# Patient Record
Sex: FEMALE | HISPANIC OR LATINO | Employment: FULL TIME | URBAN - METROPOLITAN AREA
[De-identification: names, ages, dates, MRNs, and addresses within clinical notes are randomized per-mention and may not be internally consistent; named-entity substitution may affect disease eponyms.]

---

## 2017-01-23 ENCOUNTER — HOSPITAL ENCOUNTER (OUTPATIENT)
Dept: RADIOLOGY | Facility: MEDICAL CENTER | Age: 42
End: 2017-01-23
Attending: NURSE PRACTITIONER
Payer: COMMERCIAL

## 2017-01-23 DIAGNOSIS — Z13.9 SCREENING: ICD-10-CM

## 2017-02-09 ENCOUNTER — HOSPITAL ENCOUNTER (OUTPATIENT)
Dept: RADIOLOGY | Facility: MEDICAL CENTER | Age: 42
End: 2017-02-09
Attending: OBSTETRICS & GYNECOLOGY
Payer: COMMERCIAL

## 2017-02-09 DIAGNOSIS — N60.01 BREAST CYST, RIGHT: ICD-10-CM

## 2017-02-09 DIAGNOSIS — Z09 FOLLOW-UP EXAM, 3-6 MONTHS SINCE PREVIOUS EXAM: ICD-10-CM

## 2017-02-09 PROCEDURE — 76642 ULTRASOUND BREAST LIMITED: CPT | Mod: RT

## 2017-06-29 ENCOUNTER — OFFICE VISIT (OUTPATIENT)
Dept: URGENT CARE | Facility: CLINIC | Age: 42
End: 2017-06-29
Payer: COMMERCIAL

## 2017-06-29 ENCOUNTER — APPOINTMENT (OUTPATIENT)
Dept: RADIOLOGY | Facility: IMAGING CENTER | Age: 42
End: 2017-06-29
Attending: PHYSICIAN ASSISTANT
Payer: COMMERCIAL

## 2017-06-29 VITALS
OXYGEN SATURATION: 97 % | BODY MASS INDEX: 27.32 KG/M2 | WEIGHT: 170 LBS | HEIGHT: 66 IN | HEART RATE: 102 BPM | TEMPERATURE: 98.7 F | SYSTOLIC BLOOD PRESSURE: 118 MMHG | DIASTOLIC BLOOD PRESSURE: 80 MMHG
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
## 2017-06-29 DIAGNOSIS — M25.562 ACUTE PAIN OF LEFT KNEE: ICD-10-CM

## 2017-06-29 PROCEDURE — 99214 OFFICE O/P EST MOD 30 MIN: CPT | Performed by: PHYSICIAN ASSISTANT

## 2017-06-29 PROCEDURE — 73562 X-RAY EXAM OF KNEE 3: CPT | Mod: TC | Performed by: PHYSICIAN ASSISTANT

## 2017-06-29 RX ORDER — ACETAMINOPHEN AND CODEINE PHOSPHATE 300; 30 MG/1; MG/1
1 TABLET ORAL EVERY 8 HOURS
Qty: 15 TAB | Refills: 0 | Status: SHIPPED | OUTPATIENT
Start: 2017-06-29 | End: 2017-07-04

## 2017-06-29 ASSESSMENT — ENCOUNTER SYMPTOMS
COUGH: 0
HEADACHES: 0
BACK PAIN: 0
DIZZINESS: 0
EYE DISCHARGE: 0
WHEEZING: 0
JOINT SWELLING: 1
VOMITING: 0
SENSORY CHANGE: 0
NECK PAIN: 0
NAUSEA: 0
VISUAL CHANGE: 0
FALLS: 0
EYE REDNESS: 0
CHILLS: 0
ABDOMINAL PAIN: 0

## 2017-06-29 ASSESSMENT — PAIN SCALES - GENERAL: PAINLEVEL: 7=MODERATE-SEVERE PAIN

## 2017-06-29 NOTE — PROGRESS NOTES
"Subjective:      Aniyah Shelton is a 41 y.o. female who presents with Knee Pain          Pt is 40 y/o female who presents with left knee pain for the last 6 days. Pt's first injury was when she was playing volleyball and fell straight on top her knee. She was \"Sore\" but has continued to walk on it. Then yesterday she was walking down a hill and her foot slipped causing her knee to twist and then it gave out on her. She now reports pain to the inside portion of her knee with swelling.   Knee Pain  This is a new problem. Episode onset: 6 days ago. The problem occurs intermittently (Worse since yesterday. ). The problem has been gradually worsening. Associated symptoms include joint swelling. Pertinent negatives include no abdominal pain, chills, congestion, coughing, headaches, nausea, neck pain, rash, visual change or vomiting. Exacerbated by: twisting and fully extending knee, walking.  She has tried acetaminophen and ice for the symptoms. The treatment provided mild relief.       Review of Systems   Constitutional: Negative for chills.   HENT: Negative for congestion.    Eyes: Negative for discharge and redness.   Respiratory: Negative for cough and wheezing.    Gastrointestinal: Negative for nausea, vomiting and abdominal pain.   Musculoskeletal: Positive for joint pain and joint swelling. Negative for back pain, falls and neck pain.   Skin: Negative for rash.   Neurological: Negative for dizziness, sensory change and headaches.          Objective:     /80 mmHg  Pulse 102  Temp(Src) 37.1 °C (98.7 °F)  Ht 1.676 m (5' 5.98\")  Wt 77.111 kg (170 lb)  BMI 27.45 kg/m2  SpO2 97%   PMH:  has a past medical history of Anxiety; Migraine (5/24/2010); GERD (gastroesophageal reflux disease) (5/24/2010); Sinusitis; Indigestion; Heart burn; Other specified disorder of intestines; and Psychiatric problem. She also has no past medical history of Allergy or Anemia.  MEDS:   Current outpatient prescriptions:   •  " ranitidine (ZANTAC) 150 MG Tab, TAKE ONE TABLET BY MOUTH TWICE DAILY, Disp: 60 Tab, Rfl: 6  •  sumatriptan (IMITREX) 100 MG tablet, Take 1 Tab by mouth Once PRN for Migraine for up to 1 dose., Disp: 10 Tab, Rfl: 3  •  ondansetron (ZOFRAN) 4 MG Tab tablet, Take 1 Tab by mouth every 8 hours as needed for Nausea/Vomiting., Disp: 10 Each, Rfl: 2  •  fluticasone (FLONASE) 50 MCG/ACT nasal spray, Spray 2 Sprays in nose every day., Disp: 1 Bottle, Rfl: 0  ALLERGIES:   Allergies   Allergen Reactions   • Aspirin Hives and Swelling   • Ibuprofen Swelling   • Naproxen Anaphylaxis     SURGHX:   Past Surgical History   Procedure Laterality Date   • Antrostomy  4/3/2013     Performed by Camryn Dia M.D. at SURGERY SAME DAY [facility]   • Ethmoidectomy  4/3/2013     Performed by Camryn Dia M.D. at SURGERY SAME DAY [facility]   • Sphenoidectomy  4/3/2013     Performed by Camryn Dia M.D. at SURGERY SAME DAY [facility]   • Turbinoplasty  4/3/2013     Performed by Camryn Dia M.D. at SURGERY SAME DAY [facility]   • Hysteroscopy novasure-2  8/1/2014     Performed by Emmanuel Mac M.D. at SURGERY SAME DAY [facility]   • Us-cyst aspiration-breast initial Left 5-     SOCHX:  reports that she has never smoked. She has never used smokeless tobacco. She reports that she does not drink alcohol or use illicit drugs.  FH: Family history was reviewed, no pertinent findings to report    Physical Exam   Constitutional: She is oriented to person, place, and time. She appears well-developed and well-nourished.   HENT:   Head: Normocephalic and atraumatic.   Eyes: EOM are normal. Pupils are equal, round, and reactive to light.   Neck: Normal range of motion. Neck supple.   Cardiovascular: Normal rate and regular rhythm.    Pulmonary/Chest: Effort normal and breath sounds normal.   Musculoskeletal:   Left knee:  With medial knee swelling- without noted deformity  Tenderness along medial compartment  with increased pain with Guero. Limited ROM with extension.   Neg. , Anterior and posterior drawer signs, and Lachman’s test.      Neurological: She is alert and oriented to person, place, and time.   Skin: Skin is warm.   Abrasion to left knee.    Vitals reviewed.            XR knee:  No acute fracture identified.  No evidence of any joint effusion.  Tiny patellar spurs.    Assessment/Plan:     1. Acute pain of left knee  - DX-KNEE 3 VIEWS LEFT; Future    2. Fall, initial encounter    At this time I am concerned about a possible meniscus injury- pt. Was placed in brace and has crutches at home.   Tylenol #3 was written for night time use.   NARCHECK was reviewed by myself-  Document  does not reveal any concerning patterns. Pt. was advised to avoid the operation of heavy machine along with driving while on such medications. Finally pt. was advised to use medication only as prescribed.     Ice, and compression, elevate the knee.   Referral to Dr. Brown for further monitoring and re-evaluation was made today. Discussed xray with patient today as well.   All questions were answered.   Patient given precautionary s/sx that mandate immediate follow up and evaluation in the ED. Advised of risks of not doing so.    DDX, Supportive care, and indications for immediate follow-up discussed with patient.    Instructed to return to clinic or nearest emergency department if we are not available for any change in condition, further concerns, or worsening of symptoms.    The patient demonstrated a good understanding and agreed with the treatment plan.

## 2017-06-29 NOTE — MR AVS SNAPSHOT
"        Aniyah Shelton   2017 8:15 AM   Office Visit   MRN: [id number]    Department:  [facility]   Dept Phone:  [phone]    Description:  Female : 1975   Provider:  Shant Lemos PA-C           Reason for Visit     Knee Pain \"fell down and twisted knee yesterday,a little swollen\"      Allergies as of 2017     Allergen Noted Reactions    Aspirin 2011   Hives, Swelling    Ibuprofen 2009   Swelling    Naproxen 2013   Anaphylaxis      You were diagnosed with     Acute pain of left knee   [5413406]       Fall, initial encounter   [[id number]]         Vital Signs     Blood Pressure Pulse Temperature Height Weight Body Mass Index    118/80 mmHg 102 37.1 °C (98.7 °F) 1.676 m (5' 5.98\") 77.111 kg (170 lb) 27.45 kg/m2    Oxygen Saturation Smoking Status                97% Never Smoker           Basic Information     Date Of Birth Sex Race Ethnicity Preferred Language    1975 Female  or   Origin (Macedonian,Montenegrin,Lithuanian,Truong, etc) English      Problem List              ICD-10-CM Priority Class Noted - Resolved    GERD (gastroesophageal reflux disease) K21.9   2010 - Present    Food allergy Z91.018   11/15/2011 - Present    Chronic maxillary sinusitis J32.0   4/3/2013 - Present    Vitamin D deficiency disease E55.9   10/15/2013 - Present    Metrorrhagia N92.1   2014 - Present    Periodic headache syndrome, not intractable G43.C0   2016 - Present      Health Maintenance        Date Due Completion Dates    PAP SMEAR 1995 ---    MAMMOGRAM 2017, 2012, 2010, 2007, 2007    IMM DTaP/Tdap/Td Vaccine (2 - Td) 2022    COLONOSCOPY 2024            Current Immunizations     Influenza LAIV (Nasal) 10/15/2012    Influenza TIV (IM) 2014, 10/22/2013    Influenza Vaccine Quad Inj (Pf) 2014    Tdap Vaccine 2012  4:43 PM      Below and/or attached are the " medications your provider expects you to take. Review all of your home medications and newly ordered medications with your provider and/or pharmacist. Follow medication instructions as directed by your provider and/or pharmacist. Please keep your medication list with you and share with your provider. Update the information when medications are discontinued, doses are changed, or new medications (including over-the-counter products) are added; and carry medication information at all times in the event of emergency situations     Allergies:  ASPIRIN - Hives,Swelling     IBUPROFEN - Swelling     NAPROXEN - Anaphylaxis               Medications  Valid as of: June 29, 2017 -  9:57 AM    Generic Name Brand Name Tablet Size Instructions for use    Acetaminophen-Codeine (Tab) Acetaminophen-Codeine 300-30 MG Take 1 Tab by mouth every 8 hours for 5 days.        Fluticasone Propionate (Suspension) FLONASE 50 MCG/ACT Spray 2 Sprays in nose every day.        Ondansetron HCl (Tab) ZOFRAN 4 MG Take 1 Tab by mouth every 8 hours as needed for Nausea/Vomiting.        RaNITidine HCl (Tab) ZANTAC 150 MG TAKE ONE TABLET BY MOUTH TWICE DAILY        SUMAtriptan Succinate (Tab) IMITREX 100 MG Take 1 Tab by mouth Once PRN for Migraine for up to 1 dose.        .                 Medicines prescribed today were sent to:     [id number] - [place], [place] - [address] [facility]    [id number] [place] [address]    Phone: [phone] Fax: [phone]    Open 24 Hours?: No      Medication refill instructions:       If your prescription bottle indicates you have medication refills left, it is not necessary to call your provider’s office. Please contact your pharmacy and they will refill your medication.    If your prescription bottle indicates you do not have any refills left, you may request refills at any time through one of the following ways: The online [facility] system (except Urgent Care), by calling your provider’s office, or by  asking your pharmacy to contact your provider’s office with a refill request. Medication refills are processed only during regular business hours and may not be available until the next business day. Your provider may request additional information or to have a follow-up visit with you prior to refilling your medication.   *Please Note: Medication refills are assigned a new Rx number when refilled electronically. Your pharmacy may indicate that no refills were authorized even though a new prescription for the same medication is available at the pharmacy. Please request the medicine by name with the pharmacy before contacting your provider for a refill.        Your To Do List     Future Labs/Procedures Complete By Expires    DX-KNEE 3 VIEWS LEFT  As directed 6/29/2018         [employer] Access Code: Activation code not generated  Current [employer] Status: Active

## 2017-06-29 NOTE — Clinical Note
June 29, 2017         Patient: Aniyah Shelton   YOB: 1975   Date of Visit: 6/29/2017           To Whom it May Concern:    Aniyah Shelton was seen in my clinic on 6/29/2017. Please excuse this patient from work due to recent injury- Patient may return on Monday if symptoms have improved.     If you have any questions or concerns, please don't hesitate to call.        Sincerely,           Shant Lemos PA-C  Electronically Signed

## 2017-07-13 ENCOUNTER — TELEPHONE (OUTPATIENT)
Dept: MEDICAL GROUP | Facility: MEDICAL CENTER | Age: 42
End: 2017-07-13

## 2017-07-13 NOTE — TELEPHONE ENCOUNTER
1. Caller Name: Aniyah Shelton                                             Call Back Number: [phone]        Patient approves a detailed voicemail message: N\A    Pt left a vm stating she was seen in urgent care on 6/29 because she twisted her knee and she was given a brace at that time, had x rays done and was told that they would give her a referral to see an specialist but has not heard anything so she wanted us to place a new referral.  She has not seen us over a year so i called her to let her know that for any orders needed she would need to schedule an appt since we have not seen her over a year. i checked on pt chart and seems like the referral was placed form urgent care, she needs to call the referral department to schedule an appt.

## 2017-07-17 ENCOUNTER — OFFICE VISIT (OUTPATIENT)
Dept: MEDICAL GROUP | Facility: CLINIC | Age: 42
End: 2017-07-17
Payer: COMMERCIAL

## 2017-07-17 VITALS
HEART RATE: 88 BPM | SYSTOLIC BLOOD PRESSURE: 122 MMHG | TEMPERATURE: 98.2 F | WEIGHT: 170 LBS | OXYGEN SATURATION: 96 % | DIASTOLIC BLOOD PRESSURE: 78 MMHG | RESPIRATION RATE: 18 BRPM | HEIGHT: 66 IN | BODY MASS INDEX: 27.32 KG/M2

## 2017-07-17 DIAGNOSIS — M25.562 ACUTE PAIN OF LEFT KNEE: ICD-10-CM

## 2017-07-17 DIAGNOSIS — M25.662 DECREASED RANGE OF MOTION OF LEFT KNEE: ICD-10-CM

## 2017-07-17 DIAGNOSIS — S83.412A SPRAIN OF MEDIAL COLLATERAL LIGAMENT OF LEFT KNEE, INITIAL ENCOUNTER: ICD-10-CM

## 2017-07-17 PROCEDURE — 99202 OFFICE O/P NEW SF 15 MIN: CPT | Performed by: FAMILY MEDICINE

## 2017-07-17 RX ORDER — TRAMADOL HYDROCHLORIDE 50 MG/1
50 TABLET ORAL EVERY 6 HOURS PRN
Qty: 28 TAB | Refills: 0 | Status: SHIPPED | OUTPATIENT
Start: 2017-07-17 | End: 2018-01-04

## 2017-07-17 ASSESSMENT — PATIENT HEALTH QUESTIONNAIRE - PHQ9: CLINICAL INTERPRETATION OF PHQ2 SCORE: 0

## 2017-07-17 NOTE — MR AVS SNAPSHOT
"        Aniyah Shelton   2017 2:30 PM   Office Visit   MRN: [id number]    Department:  [facility]   Dept Phone:  [phone]    Description:  Female : 1975   Provider:  Wilmer Brown M.D.           Reason for Visit     Knee Pain Referral from / L knee Pain       Allergies as of 2017     Allergen Noted Reactions    Aspirin 2011   Hives, Swelling    Ibuprofen 2009   Swelling    Naproxen 2013   Anaphylaxis      You were diagnosed with     Acute pain of left knee   [3137703]       Sprain of medial collateral ligament of left knee, initial encounter   [836163]       Decreased range of motion of left knee   [[id number]]         Vital Signs     Blood Pressure Pulse Temperature Respirations Height Weight    122/78 mmHg 88 36.8 °C (98.2 °F) 18 1.676 m (5' 6\") 77.111 kg (170 lb)    Body Mass Index Oxygen Saturation Smoking Status             27.45 kg/m2 96% Never Smoker          Basic Information     Date Of Birth Sex Race Ethnicity Preferred Language    1975 Female  or   Origin (Belarusian,Indian,Tongan,Truong, etc) English      Your appointments     2017  3:00 PM   Established Patient with Shashi Valdez M.D.   [facility] [address]    [address]  [address]  [facility] [id number] [phone]           You will be receiving a confirmation call a few days before your appointment from our automated call confirmation system.            Aug 18, 2017  4:00 PM   Established Patient with Wilmer Brown M.D.   [facility]    [address] [facility] [id number] [phone]           You will be receiving a confirmation call a few days before your appointment from our automated call confirmation system.              Problem List              ICD-10-CM Priority Class Noted - Resolved    GERD (gastroesophageal reflux disease) K21.9   2010 - Present    Food allergy " Z91.018   11/15/2011 - Present    Chronic maxillary sinusitis J32.0   4/3/2013 - Present    Vitamin D deficiency disease E55.9   10/15/2013 - Present    Metrorrhagia N92.1   8/1/2014 - Present    Periodic headache syndrome, not intractable G43.C0   5/9/2016 - Present      Health Maintenance        Date Due Completion Dates    PAP SMEAR 9/22/1995 ---    MAMMOGRAM 5/18/2017 5/18/2016, 5/18/2012, 12/13/2010, 11/9/2007, 11/9/2007    IMM INFLUENZA (1) 9/1/2017 11/12/2014, 11/12/2014, 10/22/2013, 10/15/2012    IMM DTaP/Tdap/Td Vaccine (2 - Td) 4/26/2022 4/26/2012    COLONOSCOPY 4/28/2024 4/28/2014            Current Immunizations     Influenza LAIV (Nasal) 10/15/2012    Influenza TIV (IM) 11/12/2014, 10/22/2013    Influenza Vaccine Quad Inj (Pf) 11/12/2014    Tdap Vaccine 4/26/2012  4:43 PM      Below and/or attached are the medications your provider expects you to take. Review all of your home medications and newly ordered medications with your provider and/or pharmacist. Follow medication instructions as directed by your provider and/or pharmacist. Please keep your medication list with you and share with your provider. Update the information when medications are discontinued, doses are changed, or new medications (including over-the-counter products) are added; and carry medication information at all times in the event of emergency situations     Allergies:  ASPIRIN - Hives,Swelling     IBUPROFEN - Swelling     NAPROXEN - Anaphylaxis               Medications  Valid as of: July 17, 2017 -  3:22 PM    Generic Name Brand Name Tablet Size Instructions for use    Fluticasone Propionate (Suspension) FLONASE 50 MCG/ACT Spray 2 Sprays in nose every day.        Ondansetron HCl (Tab) ZOFRAN 4 MG Take 1 Tab by mouth every 8 hours as needed for Nausea/Vomiting.        RaNITidine HCl (Tab) ZANTAC 150 MG TAKE ONE TABLET BY MOUTH TWICE DAILY        SUMAtriptan Succinate (Tab) IMITREX 100 MG Take 1 Tab by mouth Once PRN for Migraine  for up to 1 dose.        TraMADol HCl (Tab) ULTRAM 50 MG Take 1 Tab by mouth every 6 hours as needed for Moderate Pain.        .                 Medicines prescribed today were sent to:     [facility] - [place], [place] - [address] [facility]    [address] [place] [address]    Phone: [phone] Fax: [phone]    Open 24 Hours?: No      Medication refill instructions:       If your prescription bottle indicates you have medication refills left, it is not necessary to call your provider’s office. Please contact your pharmacy and they will refill your medication.    If your prescription bottle indicates you do not have any refills left, you may request refills at any time through one of the following ways: The online [employer] system (except Urgent Care), by calling your provider’s office, or by asking your pharmacy to contact your provider’s office with a refill request. Medication refills are processed only during regular business hours and may not be available until the next business day. Your provider may request additional information or to have a follow-up visit with you prior to refilling your medication.   *Please Note: Medication refills are assigned a new Rx number when refilled electronically. Your pharmacy may indicate that no refills were authorized even though a new prescription for the same medication is available at the pharmacy. Please request the medicine by name with the pharmacy before contacting your provider for a refill.        Referral     A referral request has been sent to our patient care coordination department. Please allow 3-5 business days for us to process this request and contact you either by phone or mail. If you do not hear from us by the 5th business day, please call us at [phone].           [employer] Access Code: Activation code not generated  Current [employer] Status: Active

## 2017-07-17 NOTE — TELEPHONE ENCOUNTER
Patient has been advised about the message below and scheduled an appointment on 7/26 to see Dr. Valdez.

## 2017-07-17 NOTE — PROGRESS NOTES
CHIEF COMPLAINT:  Aniyah Shelton female presenting at the request of Shant Lemos PA-C for evaluation of knee pain.     Aniyah Shelton is complaining of left knee pain  present for DOI: 6/28/17 , cleaning her yard and slipped, valgus injury (slipped on patch of grass), heard grinding noise and a pop..  Pain is at the anterior, medial  knee  Quality is aching, sharp  Pain is non-radiating   Improved with resting  Aggravated by movement, stairs  no prior problems with this area in the past   Prior Treatments: seen at    Prior studies: X-Ray   Medications tried for pain include: tylenol with minimal impromvement  Mechanical Symptom history: No Locking    PAST MEDICAL HISTORY:   History reviewed. No pertinent past medical history.    PMH:  has a past medical history of Anxiety; Migraine (5/24/2010); GERD (gastroesophageal reflux disease) (5/24/2010); Sinusitis; Indigestion; Heart burn; Other specified disorder of intestines; and Psychiatric problem. She also has no past medical history of Allergy or Anemia.  MEDS:   Current outpatient prescriptions:   •  ranitidine (ZANTAC) 150 MG Tab, TAKE ONE TABLET BY MOUTH TWICE DAILY, Disp: 60 Tab, Rfl: 6  •  sumatriptan (IMITREX) 100 MG tablet, Take 1 Tab by mouth Once PRN for Migraine for up to 1 dose., Disp: 10 Tab, Rfl: 3  •  ondansetron (ZOFRAN) 4 MG Tab tablet, Take 1 Tab by mouth every 8 hours as needed for Nausea/Vomiting., Disp: 10 Each, Rfl: 2  •  fluticasone (FLONASE) 50 MCG/ACT nasal spray, Spray 2 Sprays in nose every day., Disp: 1 Bottle, Rfl: 0  ALLERGIES:   Allergies   Allergen Reactions   • Aspirin Hives and Swelling   • Ibuprofen Swelling   • Naproxen Anaphylaxis     SURGHX:   Past Surgical History   Procedure Laterality Date   • Antrostomy  4/3/2013     Performed by Camryn Dia M.D. at SURGERY SAME DAY [facility]   • Ethmoidectomy  4/3/2013     Performed by Camryn Dia M.D. at SURGERY SAME DAY [facility]   • Sphenoidectomy   "4/3/2013     Performed by Camryn Dia M.D. at SURGERY SAME DAY [facility]   • Turbinoplasty  4/3/2013     Performed by Camryn Dia M.D. at SURGERY SAME DAY [facility]   • Hysteroscopy novasure-2  8/1/2014     Performed by Emmanuel Mac M.D. at SURGERY SAME DAY [facility]   • Us-cyst aspiration-breast initial Left 5-     SOCHX:  reports that she has never smoked. She has never used smokeless tobacco. She reports that she does not drink alcohol or use illicit drugs.  FH: Family history was reviewed, no pertinent findings to report     PHYSICAL EXAM:  /78 mmHg  Pulse 88  Temp(Src) 36.8 °C (98.2 °F)  Resp 18  Ht 1.676 m (5' 6\")  Wt 77.111 kg (170 lb)  BMI 27.45 kg/m2  SpO2 96%     slightly overweight in no apparent distress, alert and oriented x 3.  Gait: antalgic     RIGHT Knee:  Slight Varus and No Swelling  Range of Motion Intact  Trace effusion  Patellar No tenderness and no apprehension  Medial Joint Line Non-tender and NEGATIVE Guero  Lateral Joint Line Non-tender and NEGATIVE Guero  Trace Laxity with Varus stress  Trace Laxity with Valgus stress  Lachman's testing is Trace  Posterior Drawer Testing is Trace  The leg is otherwise neurovascularly intact    LEFT Knee:  Slight Varus and No Swelling   Range of Motion Markedly limited with Extension (15 degrees extension)   Trace effusion  Patellar No tenderness and no apprehension  Medial Joint Line Tenderness and NEGATIVE Guero  Lateral Joint Line Non-tender and NEGATIVE Guero  Trace Laxity with Varus stress  1+ Laxity with Valgus stress  Lachman's testing is Trace  Posterior Drawer Testing is Trace  The leg is otherwise neurovascularly intact      Additional Findings: None      1. Acute pain of left knee     2. Sprain of medial collateral ligament of left knee, initial encounter  tramadol (ULTRAM) 50 MG Tab   3. Decreased range of motion of left knee  REFERRAL TO PHYSICAL THERAPY Reason for Therapy: " Eval/Treat/Report     Hinged knee brace (patient has at home)  Recommend use  PT  Follow-up if symptoms worsen or fail to improve    Return in about 4 weeks (around 8/14/2017).    done elsewhere and reviewed independently by me    Thank you Shant Lemos PA-C for allowing me to participate in caring for your patient.

## 2017-07-26 ENCOUNTER — HOSPITAL ENCOUNTER (OUTPATIENT)
Dept: PHYSICAL THERAPY | Facility: REHABILITATION | Age: 42
End: 2017-07-26
Attending: FAMILY MEDICINE
Payer: COMMERCIAL

## 2017-07-26 PROCEDURE — 97161 PT EVAL LOW COMPLEX 20 MIN: CPT

## 2017-07-26 PROCEDURE — 97014 ELECTRIC STIMULATION THERAPY: CPT

## 2017-07-31 ENCOUNTER — APPOINTMENT (OUTPATIENT)
Dept: PHYSICAL THERAPY | Facility: REHABILITATION | Age: 42
End: 2017-07-31
Attending: FAMILY MEDICINE
Payer: COMMERCIAL

## 2017-08-03 ENCOUNTER — APPOINTMENT (OUTPATIENT)
Dept: PHYSICAL THERAPY | Facility: REHABILITATION | Age: 42
End: 2017-08-03
Attending: FAMILY MEDICINE
Payer: COMMERCIAL

## 2017-08-07 ENCOUNTER — APPOINTMENT (OUTPATIENT)
Dept: PHYSICAL THERAPY | Facility: REHABILITATION | Age: 42
End: 2017-08-07
Attending: FAMILY MEDICINE
Payer: COMMERCIAL

## 2017-08-17 RX ORDER — RANITIDINE 150 MG/1
TABLET ORAL
Qty: 60 TAB | Refills: 6 | Status: SHIPPED | OUTPATIENT
Start: 2017-08-17 | End: 2018-08-26 | Stop reason: SDUPTHER

## 2018-01-04 ENCOUNTER — OFFICE VISIT (OUTPATIENT)
Dept: MEDICAL GROUP | Facility: MEDICAL CENTER | Age: 43
End: 2018-01-04
Payer: COMMERCIAL

## 2018-01-04 VITALS
WEIGHT: 177 LBS | DIASTOLIC BLOOD PRESSURE: 80 MMHG | HEART RATE: 79 BPM | TEMPERATURE: 98.4 F | OXYGEN SATURATION: 96 % | SYSTOLIC BLOOD PRESSURE: 120 MMHG | RESPIRATION RATE: 16 BRPM | HEIGHT: 66 IN | BODY MASS INDEX: 28.45 KG/M2

## 2018-01-04 DIAGNOSIS — Z12.31 VISIT FOR SCREENING MAMMOGRAM: ICD-10-CM

## 2018-01-04 DIAGNOSIS — R05.9 COUGH: ICD-10-CM

## 2018-01-04 DIAGNOSIS — Z13.220 SCREENING, LIPID: ICD-10-CM

## 2018-01-04 PROCEDURE — 99213 OFFICE O/P EST LOW 20 MIN: CPT | Performed by: INTERNAL MEDICINE

## 2018-01-04 RX ORDER — BENZONATATE 100 MG/1
100 CAPSULE ORAL 3 TIMES DAILY PRN
Qty: 30 CAP | Refills: 0 | Status: SHIPPED | OUTPATIENT
Start: 2018-01-04 | End: 2019-04-02

## 2018-01-04 NOTE — PROGRESS NOTES
"CC: Cough    HPI:   Aniyah presents today with the following.    1. Cough  Presents complaining of cough for last 8 days. No fevers or chills only slightly productive. Some mild sore throat no earaches or other localizing infectious symptoms and no shortness of breath.    2. Visit for screening mammogram      3. Screening, lipid        Patient Active Problem List    Diagnosis Date Noted   • Periodic headache syndrome, not intractable 05/09/2016   • Metrorrhagia 08/01/2014   • Vitamin D deficiency disease 10/15/2013   • Food allergy 11/15/2011   • GERD (gastroesophageal reflux disease) 05/24/2010       Current Outpatient Prescriptions   Medication Sig Dispense Refill   • benzonatate (TESSALON) 100 MG Cap Take 1 Cap by mouth 3 times a day as needed for Cough. 30 Cap 0   • ranitidine (ZANTAC) 150 MG Tab TAKE ONE TABLET BY MOUTH TWICE DAILY 60 Tab 6   • sumatriptan (IMITREX) 100 MG tablet Take 1 Tab by mouth Once PRN for Migraine for up to 1 dose. 10 Tab 3   • fluticasone (FLONASE) 50 MCG/ACT nasal spray Spray 2 Sprays in nose every day. 1 Bottle 0     No current facility-administered medications for this visit.          Allergies as of 01/04/2018 - Reviewed 07/17/2017   Allergen Reaction Noted   • Aspirin Hives and Swelling 03/18/2011   • Ibuprofen Swelling 08/09/2009   • Naproxen Anaphylaxis 03/04/2013        ROS: As per HPI.    /80   Pulse 79   Temp 36.9 °C (98.4 °F)   Resp 16   Ht 1.676 m (5' 6\")   Wt 80.3 kg (177 lb)   SpO2 96%   BMI 28.57 kg/m²     Physical Exam:  Gen:         Alert and oriented, No apparent distress.  Heent:       TMs clear bilaterally, oropharynx without erythema or exudates  Neck:        No Lymphadenopathy or Bruits.  Lungs:     Clear to auscultation bilaterally  CV:          Regular rate and rhythm. No murmurs, rubs or gallops.               Ext:          No clubbing, cyanosis, edema.      Assessment and Plan.   42 y.o. female with the following issues.    1. Cough  Likely viral in " etiology. Have recommended over-the-counter pain control and symptom relief. Patient will followup if spiking fevers or symptoms worsen.  - benzonatate (TESSALON) 100 MG Cap; Take 1 Cap by mouth 3 times a day as needed for Cough.  Dispense: 30 Cap; Refill: 0      2. Visit for screening mammogram      - MA-SCREEN MAMMO W/CAD-BILAT; Future    3. Screening, lipid    - COMP METABOLIC PANEL; Future  - LIPID PROFILE; Future

## 2018-08-02 ENCOUNTER — HOSPITAL ENCOUNTER (OUTPATIENT)
Dept: LAB | Facility: MEDICAL CENTER | Age: 43
End: 2018-08-02
Attending: FAMILY MEDICINE
Payer: COMMERCIAL

## 2018-08-02 LAB
ALBUMIN SERPL BCP-MCNC: 4.5 G/DL (ref 3.2–4.9)
ALBUMIN/GLOB SERPL: 1.8 G/DL
ALP SERPL-CCNC: 68 U/L (ref 30–99)
ALT SERPL-CCNC: 36 U/L (ref 2–50)
ANION GAP SERPL CALC-SCNC: 9 MMOL/L (ref 0–11.9)
AST SERPL-CCNC: 33 U/L (ref 12–45)
BASOPHILS # BLD AUTO: 0.4 % (ref 0–1.8)
BASOPHILS # BLD: 0.03 K/UL (ref 0–0.12)
BILIRUB SERPL-MCNC: 0.5 MG/DL (ref 0.1–1.5)
BUN SERPL-MCNC: 9 MG/DL (ref 8–22)
CALCIUM SERPL-MCNC: 9.4 MG/DL (ref 8.5–10.5)
CHLORIDE SERPL-SCNC: 103 MMOL/L (ref 96–112)
CHOLEST SERPL-MCNC: 140 MG/DL (ref 100–199)
CO2 SERPL-SCNC: 26 MMOL/L (ref 20–33)
CREAT SERPL-MCNC: 0.82 MG/DL (ref 0.5–1.4)
EOSINOPHIL # BLD AUTO: 0.1 K/UL (ref 0–0.51)
EOSINOPHIL NFR BLD: 1.5 % (ref 0–6.9)
ERYTHROCYTE [DISTWIDTH] IN BLOOD BY AUTOMATED COUNT: 45.1 FL (ref 35.9–50)
EST. AVERAGE GLUCOSE BLD GHB EST-MCNC: 131 MG/DL
GLOBULIN SER CALC-MCNC: 2.5 G/DL (ref 1.9–3.5)
GLUCOSE SERPL-MCNC: 105 MG/DL (ref 65–99)
HBA1C MFR BLD: 6.2 % (ref 0–5.6)
HCT VFR BLD AUTO: 42.8 % (ref 37–47)
HDLC SERPL-MCNC: 41 MG/DL
HGB BLD-MCNC: 14.1 G/DL (ref 12–16)
IMM GRANULOCYTES # BLD AUTO: 0.03 K/UL (ref 0–0.11)
IMM GRANULOCYTES NFR BLD AUTO: 0.4 % (ref 0–0.9)
LDLC SERPL CALC-MCNC: 67 MG/DL
LYMPHOCYTES # BLD AUTO: 2.03 K/UL (ref 1–4.8)
LYMPHOCYTES NFR BLD: 30.1 % (ref 22–41)
MCH RBC QN AUTO: 29.1 PG (ref 27–33)
MCHC RBC AUTO-ENTMCNC: 32.9 G/DL (ref 33.6–35)
MCV RBC AUTO: 88.2 FL (ref 81.4–97.8)
MONOCYTES # BLD AUTO: 0.71 K/UL (ref 0–0.85)
MONOCYTES NFR BLD AUTO: 10.5 % (ref 0–13.4)
NEUTROPHILS # BLD AUTO: 3.84 K/UL (ref 2–7.15)
NEUTROPHILS NFR BLD: 57.1 % (ref 44–72)
NRBC # BLD AUTO: 0 K/UL
NRBC BLD-RTO: 0 /100 WBC
PLATELET # BLD AUTO: 343 K/UL (ref 164–446)
PMV BLD AUTO: 10.3 FL (ref 9–12.9)
POTASSIUM SERPL-SCNC: 4.2 MMOL/L (ref 3.6–5.5)
PROT SERPL-MCNC: 7 G/DL (ref 6–8.2)
RBC # BLD AUTO: 4.85 M/UL (ref 4.2–5.4)
SODIUM SERPL-SCNC: 138 MMOL/L (ref 135–145)
T4 FREE SERPL-MCNC: 0.9 NG/DL (ref 0.53–1.43)
TRIGL SERPL-MCNC: 159 MG/DL (ref 0–149)
TSH SERPL DL<=0.005 MIU/L-ACNC: 1.24 UIU/ML (ref 0.38–5.33)
VIT B12 SERPL-MCNC: 380 PG/ML (ref 211–911)
WBC # BLD AUTO: 6.7 K/UL (ref 4.8–10.8)

## 2018-08-02 PROCEDURE — 84443 ASSAY THYROID STIM HORMONE: CPT

## 2018-08-02 PROCEDURE — 83036 HEMOGLOBIN GLYCOSYLATED A1C: CPT

## 2018-08-02 PROCEDURE — 36415 COLL VENOUS BLD VENIPUNCTURE: CPT

## 2018-08-02 PROCEDURE — 80053 COMPREHEN METABOLIC PANEL: CPT

## 2018-08-02 PROCEDURE — 85025 COMPLETE CBC W/AUTO DIFF WBC: CPT

## 2018-08-02 PROCEDURE — 82607 VITAMIN B-12: CPT

## 2018-08-02 PROCEDURE — 80061 LIPID PANEL: CPT

## 2018-08-02 PROCEDURE — 84439 ASSAY OF FREE THYROXINE: CPT

## 2018-08-26 RX ORDER — RANITIDINE 150 MG/1
TABLET ORAL
Qty: 180 TAB | Refills: 3 | Status: SHIPPED | OUTPATIENT
Start: 2018-08-26 | End: 2019-04-02 | Stop reason: SDUPTHER

## 2019-01-23 ENCOUNTER — OFFICE VISIT (OUTPATIENT)
Dept: URGENT CARE | Facility: CLINIC | Age: 44
End: 2019-01-23
Payer: COMMERCIAL

## 2019-01-23 VITALS
SYSTOLIC BLOOD PRESSURE: 122 MMHG | HEIGHT: 66 IN | TEMPERATURE: 97.9 F | BODY MASS INDEX: 26.68 KG/M2 | RESPIRATION RATE: 16 BRPM | OXYGEN SATURATION: 96 % | WEIGHT: 166 LBS | HEART RATE: 90 BPM | DIASTOLIC BLOOD PRESSURE: 80 MMHG

## 2019-01-23 DIAGNOSIS — J01.00 ACUTE NON-RECURRENT MAXILLARY SINUSITIS: ICD-10-CM

## 2019-01-23 PROCEDURE — 99214 OFFICE O/P EST MOD 30 MIN: CPT | Performed by: PHYSICIAN ASSISTANT

## 2019-01-23 RX ORDER — AMOXICILLIN AND CLAVULANATE POTASSIUM 875; 125 MG/1; MG/1
1 TABLET, FILM COATED ORAL 2 TIMES DAILY
Qty: 14 TAB | Refills: 0 | Status: SHIPPED | OUTPATIENT
Start: 2019-01-23 | End: 2019-01-30

## 2019-01-23 ASSESSMENT — ENCOUNTER SYMPTOMS
SORE THROAT: 0
SHORTNESS OF BREATH: 0
SINUS PAIN: 1
ABDOMINAL PAIN: 0
DIZZINESS: 0
SPUTUM PRODUCTION: 0
FEVER: 0
SINUS PRESSURE: 1
CHILLS: 0
HEADACHES: 0
HOARSE VOICE: 0
DIARRHEA: 0
COUGH: 0
VOMITING: 0
NAUSEA: 0
MUSCULOSKELETAL NEGATIVE: 1

## 2019-01-24 NOTE — PROGRESS NOTES
"Subjective:      Aniyah Shelton is a 43 y.o. female who presents with Sinus Problem (x 2 wks, nasal congestion, stuffy nose, headaches.  Some cough)            Sinus Problem   This is a new problem. The current episode started 1 to 4 weeks ago (2 weeks). The problem is unchanged. There has been no fever. Her pain is at a severity of 2/10. The pain is mild. Associated symptoms include congestion and sinus pressure. Pertinent negatives include no chills, coughing, ear pain, headaches, hoarse voice, shortness of breath or sore throat. Past treatments include oral decongestants and saline sprays. The treatment provided mild relief.       Review of Systems   Constitutional: Negative for chills and fever.   HENT: Positive for congestion, sinus pain and sinus pressure. Negative for ear pain, hoarse voice and sore throat.    Respiratory: Negative for cough, sputum production and shortness of breath.    Cardiovascular: Negative for chest pain.   Gastrointestinal: Negative for abdominal pain, diarrhea, nausea and vomiting.   Genitourinary: Negative.    Musculoskeletal: Negative.    Skin: Negative for rash.   Neurological: Negative for dizziness and headaches.        Objective:     /80 (BP Location: Left arm, Patient Position: Sitting, BP Cuff Size: Adult)   Pulse 90   Temp 36.6 °C (97.9 °F) (Temporal)   Resp 16   Ht 1.676 m (5' 6\")   Wt 75.3 kg (166 lb)   SpO2 96%   BMI 26.79 kg/m²      Physical Exam   Constitutional: She is oriented to person, place, and time. She appears well-developed and well-nourished. No distress.   HENT:   Head: Normocephalic and atraumatic.   Right Ear: Hearing, tympanic membrane, external ear and ear canal normal.   Left Ear: Hearing, tympanic membrane, external ear and ear canal normal.   Nose: Mucosal edema present. Right sinus exhibits maxillary sinus tenderness. Right sinus exhibits no frontal sinus tenderness. Left sinus exhibits maxillary sinus tenderness. Left sinus exhibits " no frontal sinus tenderness.   Mouth/Throat: Posterior oropharyngeal erythema present. No oropharyngeal exudate or posterior oropharyngeal edema.   Tenderness to percussion of maxillary sinuses bilaterally, L>R   Eyes: Pupils are equal, round, and reactive to light. Conjunctivae are normal. Right eye exhibits no discharge. Left eye exhibits no discharge.   Neck: Normal range of motion.   Cardiovascular: Normal rate, regular rhythm and normal heart sounds.    No murmur heard.  Pulmonary/Chest: Effort normal and breath sounds normal. No respiratory distress. She has no wheezes.   Musculoskeletal: Normal range of motion.   Lymphadenopathy:     She has no cervical adenopathy.   Neurological: She is alert and oriented to person, place, and time.   Skin: Skin is warm and dry. She is not diaphoretic.   Psychiatric: She has a normal mood and affect. Her behavior is normal.   Nursing note and vitals reviewed.       PMH:  has a past medical history of Anxiety; GERD (gastroesophageal reflux disease) (5/24/2010); Heart burn; Indigestion; Migraine (5/24/2010); Other specified disorder of intestines; Psychiatric problem; and Sinusitis. She also has no past medical history of Allergy or Anemia.  MEDS:   Current Outpatient Prescriptions:   •  amoxicillin-clavulanate (AUGMENTIN) 875-125 MG Tab, Take 1 Tab by mouth 2 times a day for 7 days., Disp: 14 Tab, Rfl: 0  •  raNITidine (ZANTAC) 150 MG Tab, TAKE ONE TABLET BY MOUTH TWICE DAILY, Disp: 180 Tab, Rfl: 3  •  benzonatate (TESSALON) 100 MG Cap, Take 1 Cap by mouth 3 times a day as needed for Cough., Disp: 30 Cap, Rfl: 0  •  sumatriptan (IMITREX) 100 MG tablet, Take 1 Tab by mouth Once PRN for Migraine for up to 1 dose., Disp: 10 Tab, Rfl: 3  •  fluticasone (FLONASE) 50 MCG/ACT nasal spray, Spray 2 Sprays in nose every day., Disp: 1 Bottle, Rfl: 0  ALLERGIES:   Allergies   Allergen Reactions   • Aspirin Hives and Swelling   • Ibuprofen Swelling   • Naproxen Anaphylaxis     SURGHX:    Past Surgical History:   Procedure Laterality Date   • US-CYST ASPIRATION-BREAST INITIAL Left 5-   • HYSTEROSCOPY NOVASURE-2  8/1/2014    Performed by Emmanuel Mac M.D. at SURGERY SAME DAY [facility]   • ANTROSTOMY  4/3/2013    Performed by Camryn Dia M.D. at SURGERY SAME DAY [facility]   • ETHMOIDECTOMY  4/3/2013    Performed by Camryn Dia M.D. at SURGERY SAME DAY [facility]   • SPHENOIDECTOMY  4/3/2013    Performed by Camryn Dia M.D. at SURGERY SAME DAY [facility]   • TURBINOPLASTY  4/3/2013    Performed by Camryn Dia M.D. at SURGERY SAME DAY [facility]     SOCHX:  reports that she has never smoked. She has never used smokeless tobacco. She reports that she does not drink alcohol or use drugs.  FH: family history includes Cancer in her maternal grandfather; Non-contributory in her father and mother; Other in her father and mother.       Assessment/Plan:     1. Acute non-recurrent maxillary sinusitis  - amoxicillin-clavulanate (AUGMENTIN) 875-125 MG Tab; Take 1 Tab by mouth 2 times a day for 7 days.  Dispense: 14 Tab; Refill: 0   - Complete full course of antibiotics as prescribed     Discussed use of nedi-pot, humidifier, and Flonase nasal spray for symptomatic relief. Call or return to office if symptoms persist or worsen. The patient demonstrated a good understanding and agreed with the treatment plan.

## 2019-04-02 ENCOUNTER — OFFICE VISIT (OUTPATIENT)
Dept: MEDICAL GROUP | Facility: MEDICAL CENTER | Age: 44
End: 2019-04-02
Payer: COMMERCIAL

## 2019-04-02 VITALS
DIASTOLIC BLOOD PRESSURE: 80 MMHG | RESPIRATION RATE: 16 BRPM | HEART RATE: 99 BPM | WEIGHT: 165 LBS | SYSTOLIC BLOOD PRESSURE: 100 MMHG | TEMPERATURE: 98.2 F | OXYGEN SATURATION: 96 % | HEIGHT: 66 IN | BODY MASS INDEX: 26.52 KG/M2

## 2019-04-02 DIAGNOSIS — Z91.018 FOOD ALLERGY: ICD-10-CM

## 2019-04-02 DIAGNOSIS — R53.83 FATIGUE, UNSPECIFIED TYPE: ICD-10-CM

## 2019-04-02 DIAGNOSIS — Z12.31 VISIT FOR SCREENING MAMMOGRAM: ICD-10-CM

## 2019-04-02 DIAGNOSIS — R73.02 IGT (IMPAIRED GLUCOSE TOLERANCE): ICD-10-CM

## 2019-04-02 DIAGNOSIS — F51.01 PRIMARY INSOMNIA: ICD-10-CM

## 2019-04-02 DIAGNOSIS — F43.21 GRIEF: ICD-10-CM

## 2019-04-02 PROCEDURE — 99214 OFFICE O/P EST MOD 30 MIN: CPT | Performed by: INTERNAL MEDICINE

## 2019-04-02 RX ORDER — TRAZODONE HYDROCHLORIDE 50 MG/1
50 TABLET ORAL
Qty: 30 TAB | Refills: 3 | Status: SHIPPED | OUTPATIENT
Start: 2019-04-02 | End: 2024-02-20

## 2019-04-02 RX ORDER — RANITIDINE 150 MG/1
150 TABLET ORAL 2 TIMES DAILY
Qty: 180 TAB | Refills: 3 | Status: SHIPPED | OUTPATIENT
Start: 2019-04-02 | End: 2024-02-20

## 2019-04-02 NOTE — PROGRESS NOTES
CC: Follow-up blood sugars, grief, fatigue.    HPI:   Aniyah presents today with the following.    1. IGT (impaired glucose tolerance)  Blood work from August shows an elevated blood sugar of 6.2 on her A1c.  Denies any family history of diabetes.  She has changed her diet.    2. Grief  Usually scheduled for fatigue she did lose her brother approximately 4 months ago.  Discussing her symptoms she reports tired lack of motivation concentration and screens positive for every depressive symptoms besides suicidal ideation she does not report any significant improvement over the last few months although she is less tearful less often.  Big part of this is difficulty sleeping.    3. Fatigue, unspecified type  Again complaints of fatigue that she did not identifies possible grief.    4. Primary insomnia  Difficulty falling asleep as well as staying asleep.    5. Visit for screening mammogram      6. Food allergy  She notes report possible food allergies and would like to see an allergist.      Patient Active Problem List    Diagnosis Date Noted   • IGT (impaired glucose tolerance) 04/02/2019   • Periodic headache syndrome, not intractable 05/09/2016   • Metrorrhagia 08/01/2014   • Vitamin D deficiency disease 10/15/2013   • Food allergy 11/15/2011   • GERD (gastroesophageal reflux disease) 05/24/2010       Current Outpatient Prescriptions   Medication Sig Dispense Refill   • traZODone (DESYREL) 50 MG Tab Take 1 Tab by mouth every bedtime. 30 Tab 3   • raNITidine (ZANTAC) 150 MG Tab Take 1 Tab by mouth 2 times a day. TWICE DAILY 180 Tab 3   • sumatriptan (IMITREX) 100 MG tablet Take 1 Tab by mouth Once PRN for Migraine for up to 1 dose. 10 Tab 3   • fluticasone (FLONASE) 50 MCG/ACT nasal spray Spray 2 Sprays in nose every day. 1 Bottle 0     No current facility-administered medications for this visit.          Allergies as of 04/02/2019 - Reviewed 04/02/2019   Allergen Reaction Noted   • Aspirin Hives and Swelling 03/18/2011  "  • Ibuprofen Swelling 08/09/2009   • Naproxen Anaphylaxis 03/04/2013        ROS: Denies Chest pain, SOB, LE edema.    /80   Pulse 99   Temp 36.8 °C (98.2 °F)   Resp 16   Ht 1.676 m (5' 6\")   Wt 74.8 kg (165 lb)   SpO2 96%   BMI 26.63 kg/m²     Physical Exam:  Gen:         Alert and oriented, No apparent distress.  Neck:        No Lymphadenopathy or Bruits.  Lungs:     Clear to auscultation bilaterally  CV:          Regular rate and rhythm. No murmurs, rubs or gallops.               Ext:          No clubbing, cyanosis, edema.      Assessment and Plan.   43 y.o. female with the following issues.    1. IGT (impaired glucose tolerance)  Rechecking blood sugars follow-up abnormalities  - HEMOGLOBIN A1C; Future    2. Grief  Certainly grief entering and possible depressive phase and given duration have referred to psychology.  Will see back in 2 months if not improving consider medications.  - REFERRAL TO PSYCHOLOGY    3. Fatigue, unspecified type  Sending for blood work.  - Comp Metabolic Panel; Future  - TSH; Future  - CBC WITH DIFFERENTIAL; Future    4. Primary insomnia  Certainly sleep is up component starting on trazodone cautioned about side effects.  - traZODone (DESYREL) 50 MG Tab; Take 1 Tab by mouth every bedtime.  Dispense: 30 Tab; Refill: 3    5. Visit for screening mammogram    - MA-SCREEN MAMMO W/CAD-BILAT; Future    6. Food allergy  Referred to allergist have suggested however that she keep a food journal to see what sets off her bowels for diarrhea as well as constipation.  She has been tested for celiac which was negative in the past.  - REFERRAL TO ALLERGY      "

## 2019-04-19 ENCOUNTER — HOSPITAL ENCOUNTER (OUTPATIENT)
Dept: RADIOLOGY | Facility: MEDICAL CENTER | Age: 44
End: 2019-04-19
Attending: INTERNAL MEDICINE
Payer: COMMERCIAL

## 2019-04-19 DIAGNOSIS — Z12.31 VISIT FOR SCREENING MAMMOGRAM: ICD-10-CM

## 2019-04-19 PROCEDURE — 77067 SCR MAMMO BI INCL CAD: CPT

## 2020-01-07 ENCOUNTER — OFFICE VISIT (OUTPATIENT)
Dept: DERMATOLOGY | Facility: IMAGING CENTER | Age: 45
End: 2020-01-07

## 2020-01-07 DIAGNOSIS — L98.8 RHYTIDES: ICD-10-CM

## 2020-01-08 ENCOUNTER — OFFICE VISIT (OUTPATIENT)
Dept: DERMATOLOGY | Facility: IMAGING CENTER | Age: 45
End: 2020-01-08

## 2020-01-08 DIAGNOSIS — L98.8 RHYTIDES: ICD-10-CM

## 2020-08-02 ENCOUNTER — TELEMEDICINE (OUTPATIENT)
Dept: TELEHEALTH | Facility: TELEMEDICINE | Age: 45
End: 2020-08-02
Payer: COMMERCIAL

## 2020-08-02 DIAGNOSIS — R30.0 DYSURIA: ICD-10-CM

## 2020-08-02 PROCEDURE — 99214 OFFICE O/P EST MOD 30 MIN: CPT | Mod: 95,CR | Performed by: PHYSICIAN ASSISTANT

## 2020-08-02 RX ORDER — NITROFURANTOIN 25; 75 MG/1; MG/1
100 CAPSULE ORAL 2 TIMES DAILY
Qty: 10 CAP | Refills: 0 | Status: SHIPPED | OUTPATIENT
Start: 2020-08-02 | End: 2020-08-07

## 2020-08-02 ASSESSMENT — ENCOUNTER SYMPTOMS
VOMITING: 0
HEADACHES: 0
NAUSEA: 0
CHILLS: 0
ABDOMINAL PAIN: 0
EYE PAIN: 0
DIARRHEA: 0
COUGH: 0
SHORTNESS OF BREATH: 0
SORE THROAT: 0
MYALGIAS: 0
FEVER: 0
CONSTIPATION: 0

## 2020-08-02 NOTE — PROGRESS NOTES
Telemedicine Visit: Established Patient     This encounter was conducted via ZOOM   Verbal consent was obtained. Patient's identity was verified.    Subjective:   CC: dysuria   Aniyah Shelton is a 44 y.o. female presenting for evaluation and management of burning dysuria x 1 day.  Tried Azo with mild relief.  She denies f/c, abd pain, body aches. LMP 3 years ago.     Review of Systems   Constitutional: Negative for chills and fever.   HENT: Negative for congestion, ear pain and sore throat.    Eyes: Negative for pain.   Respiratory: Negative for cough and shortness of breath.    Cardiovascular: Negative for chest pain.   Gastrointestinal: Negative for abdominal pain, constipation, diarrhea, nausea and vomiting.   Genitourinary: Positive for dysuria. Negative for frequency, hematuria and urgency.   Musculoskeletal: Negative for myalgias.   Skin: Negative for rash.   Neurological: Negative for headaches.        Current medicines (including changes today)  Medications:    • fluticasone  • raNITidine Tabs  • sumatriptan  • traZODone Tabs    Allergies: Aspirin; Ibuprofen; and Naproxen    Problem List: Aniyah Shelton has GERD (gastroesophageal reflux disease); Food allergy; Vitamin D deficiency disease; Metrorrhagia; Periodic headache syndrome, not intractable; and IGT (impaired glucose tolerance) on their problem list.    Surgical History:  Past Surgical History:   Procedure Laterality Date   • US-CYST ASPIRATION-BREAST INITIAL Left 5-   • HYSTEROSCOPY NOVASURE-2  8/1/2014    Performed by Emmanuel Mac M.D. at SURGERY SAME DAY [facility]   • ANTROSTOMY  4/3/2013    Performed by Camryn Dia M.D. at SURGERY SAME DAY [facility]   • ETHMOIDECTOMY  4/3/2013    Performed by Camryn Dia M.D. at SURGERY SAME DAY [facility]   • SPHENOIDECTOMY  4/3/2013    Performed by Camryn Dia M.D. at SURGERY SAME DAY [facility]   • TURBINOPLASTY  4/3/2013    Performed by Camryn Dia  M.D. at SURGERY SAME DAY [facility]       Past Social Hx: Aniyah Shelton  reports that she has never smoked. She has never used smokeless tobacco. She reports that she does not drink alcohol or use drugs.     Past Family Hx:  Aniyah Shelton family history includes Cancer in her maternal grandfather; Non-contributory in her father and mother; Other in her father and mother.     Problem list, medications, and allergies reviewed by myself today in Epic.     Objective:   There were no vitals taken for this visit. Not taken due to virtual visit.    Physical Exam:  Constitutional: Alert, no distress, well-groomed.  Skin: No rashes in visible areas.  Eye: Round. Conjunctiva clear, lids normal. No icterus.   ENMT: Lips pink without lesions, good dentition, moist mucous membranes. Phonation normal.  Neck: No masses, no thyromegaly. Moves freely without pain.  CV: Pulse as reported by patient is normal  Respiratory: Unlabored respiratory effort, no cough or audible wheeze  Psych: Alert and oriented x3, normal affect and mood.   Abdomen: Nontender soft palpation.  No CVA tenderness to self palpation      Assessment and Plan:   The following treatment plan was discussed:     1. Dysuria  - nitrofurantoin (MACROBID) 100 MG Cap; Take 1 Cap by mouth 2 times a day for 5 days.  Dispense: 10 Cap; Refill: 0    Azo today and then stop to make sure Macrobid is improving symptoms.  If not feeling better in 48 to 72 hours patient should present to an actual urgent care or other provider for further evaluation and management.  I discussed her signs and symptoms of pyelonephritis and she demonstrated verbal understanding.  There is no signs that she has a stone, infected stone or pyelonephritis at this point.    Follow-up: No follow-ups on file.

## 2020-12-08 ENCOUNTER — APPOINTMENT (OUTPATIENT)
Dept: DERMATOLOGY | Facility: IMAGING CENTER | Age: 45
End: 2020-12-08

## 2021-01-12 ENCOUNTER — APPOINTMENT (OUTPATIENT)
Dept: DERMATOLOGY | Facility: IMAGING CENTER | Age: 46
End: 2021-01-12

## 2021-06-15 ENCOUNTER — APPOINTMENT (OUTPATIENT)
Dept: DERMATOLOGY | Facility: IMAGING CENTER | Age: 46
End: 2021-06-15

## 2021-07-28 ENCOUNTER — APPOINTMENT (OUTPATIENT)
Dept: DERMATOLOGY | Facility: IMAGING CENTER | Age: 46
End: 2021-07-28

## 2021-10-06 ENCOUNTER — TELEMEDICINE (OUTPATIENT)
Dept: TELEHEALTH | Facility: TELEMEDICINE | Age: 46
End: 2021-10-06
Payer: COMMERCIAL

## 2021-10-06 DIAGNOSIS — J32.9 SINUSITIS, UNSPECIFIED CHRONICITY, UNSPECIFIED LOCATION: ICD-10-CM

## 2021-10-06 DIAGNOSIS — J34.89 SINUS PAIN: ICD-10-CM

## 2021-10-06 PROCEDURE — 99214 OFFICE O/P EST MOD 30 MIN: CPT | Mod: 95,CR | Performed by: FAMILY MEDICINE

## 2021-10-06 RX ORDER — AMOXICILLIN AND CLAVULANATE POTASSIUM 875; 125 MG/1; MG/1
1 TABLET, FILM COATED ORAL 2 TIMES DAILY
Qty: 14 TABLET | Refills: 0 | Status: SHIPPED | OUTPATIENT
Start: 2021-10-06 | End: 2021-10-13

## 2021-10-06 ASSESSMENT — ENCOUNTER SYMPTOMS
SORE THROAT: 0
COUGH: 0
SINUS PRESSURE: 1
SHORTNESS OF BREATH: 0
VOMITING: 0
FEVER: 0

## 2021-10-06 NOTE — PATIENT INSTRUCTIONS
Sinusitis, Adult  Sinusitis is soreness and swelling (inflammation) of your sinuses. Sinuses are hollow spaces in the bones around your face. They are located:  · Around your eyes.  · In the middle of your forehead.  · Behind your nose.  · In your cheekbones.  Your sinuses and nasal passages are lined with a fluid called mucus. Mucus drains out of your sinuses. Swelling can trap mucus in your sinuses. This lets germs (bacteria, virus, or fungus) grow, which leads to infection. Most of the time, this condition is caused by a virus.  What are the causes?  This condition is caused by:  · Allergies.  · Asthma.  · Germs.  · Things that block your nose or sinuses.  · Growths in the nose (nasal polyps).  · Chemicals or irritants in the air.  · Fungus (rare).  What increases the risk?  You are more likely to develop this condition if:  · You have a weak body defense system (immune system).  · You do a lot of swimming or diving.  · You use nasal sprays too much.  · You smoke.  What are the signs or symptoms?  The main symptoms of this condition are pain and a feeling of pressure around the sinuses. Other symptoms include:  · Stuffy nose (congestion).  · Runny nose (drainage).  · Swelling and warmth in the sinuses.  · Headache.  · Toothache.  · A cough that may get worse at night.  · Mucus that collects in the throat or the back of the nose (postnasal drip).  · Being unable to smell and taste.  · Being very tired (fatigue).  · A fever.  · Sore throat.  · Bad breath.  How is this diagnosed?  This condition is diagnosed based on:  · Your symptoms.  · Your medical history.  · A physical exam.  · Tests to find out if your condition is short-term (acute) or long-term (chronic). Your doctor may:  ? Check your nose for growths (polyps).  ? Check your sinuses using a tool that has a light (endoscope).  ? Check for allergies or germs.  ? Do imaging tests, such as an MRI or CT scan.  How is this treated?  Treatment for this condition  depends on the cause and whether it is short-term or long-term.  · If caused by a virus, your symptoms should go away on their own within 10 days. You may be given medicines to relieve symptoms. They include:  ? Medicines that shrink swollen tissue in the nose.  ? Medicines that treat allergies (antihistamines).  ? A spray that treats swelling of the nostrils.   ? Rinses that help get rid of thick mucus in your nose (nasal saline washes).  · If caused by bacteria, your doctor may wait to see if you will get better without treatment. You may be given antibiotic medicine if you have:  ? A very bad infection.  ? A weak body defense system.  · If caused by growths in the nose, you may need to have surgery.  Follow these instructions at home:  Medicines  · Take, use, or apply over-the-counter and prescription medicines only as told by your doctor. These may include nasal sprays.  · If you were prescribed an antibiotic medicine, take it as told by your doctor. Do not stop taking the antibiotic even if you start to feel better.  Hydrate and humidify    · Drink enough water to keep your pee (urine) pale yellow.  · Use a cool mist humidifier to keep the humidity level in your home above 50%.  · Breathe in steam for 10-15 minutes, 3-4 times a day, or as told by your doctor. You can do this in the bathroom while a hot shower is running.  · Try not to spend time in cool or dry air.  Rest  · Rest as much as you can.  · Sleep with your head raised (elevated).  · Make sure you get enough sleep each night.  General instructions    · Put a warm, moist washcloth on your face 3-4 times a day, or as often as told by your doctor. This will help with discomfort.  · Wash your hands often with soap and water. If there is no soap and water, use hand .  · Do not smoke. Avoid being around people who are smoking (secondhand smoke).  · Keep all follow-up visits as told by your doctor. This is important.  Contact a doctor if:  · You  have a fever.  · Your symptoms get worse.  · Your symptoms do not get better within 10 days.  Get help right away if:  · You have a very bad headache.  · You cannot stop throwing up (vomiting).  · You have very bad pain or swelling around your face or eyes.  · You have trouble seeing.  · You feel confused.  · Your neck is stiff.  · You have trouble breathing.  Summary  · Sinusitis is swelling of your sinuses. Sinuses are hollow spaces in the bones around your face.  · This condition is caused by tissues in your nose that become inflamed or swollen. This traps germs. These can lead to infection.  · If you were prescribed an antibiotic medicine, take it as told by your doctor. Do not stop taking it even if you start to feel better.  · Keep all follow-up visits as told by your doctor. This is important.  This information is not intended to replace advice given to you by your health care provider. Make sure you discuss any questions you have with your health care provider.  Document Released: 06/05/2009 Document Revised: 05/20/2019 Document Reviewed: 05/20/2019  [employer] Patient Education © 2020 [employer]

## 2021-10-06 NOTE — PROGRESS NOTES
Subjective:   Aniyah Shelton is a 46 y.o. female who presents for No chief complaint on file.        This evaluation was conducted via Zoom using secure and encrypted videoconferencing technology. The patient was in a private location in the [facility].    The patient's identity was confirmed and verbal consent was obtained for this virtual visit.    Virtual Check-in Appointment Visit    Given the importance of social distancing and other strategies recommended to reduce the risk of COVID-19 transmission, I am providing medical care to this patient via a video virtual visit in place of an in person visit at the request of the patient.  Verbal consent to telehealth, risks, benefits, and consequences were discussed. Patient retains the right to withdraw at any time. All existing confidentiality protections apply. The patient has access to all transmitted medical information. No dissemination of any patient images or information to other entities without further written consent.      Sinusitis  This is a new problem. The current episode started in the past 7 days. The problem has been gradually worsening since onset. The pain is moderate. Associated symptoms include congestion and sinus pressure (sinus pain). Pertinent negatives include no coughing, shortness of breath or sore throat. Treatments tried: fluticasone nasal spray, mucinex. The treatment provided no relief.     PMH:  has a past medical history of Anxiety, GERD (gastroesophageal reflux disease) (5/24/2010), Heart burn, Indigestion, Migraine (5/24/2010), Other specified disorder of intestines, Psychiatric problem, and Sinusitis. She also has no past medical history of Allergy or Anemia.  MEDS:   Current Outpatient Medications:   •  amoxicillin-clavulanate (AUGMENTIN) 875-125 MG Tab, Take 1 Tablet by mouth 2 times a day for 7 days., Disp: 14 Tablet, Rfl: 0  •  traZODone (DESYREL) 50 MG Tab, Take 1 Tab by mouth every bedtime., Disp: 30 Tab, Rfl: 3  •   raNITidine (ZANTAC) 150 MG Tab, Take 1 Tab by mouth 2 times a day. TWICE DAILY, Disp: 180 Tab, Rfl: 3  •  sumatriptan (IMITREX) 100 MG tablet, Take 1 Tab by mouth Once PRN for Migraine for up to 1 dose., Disp: 10 Tab, Rfl: 3  •  fluticasone (FLONASE) 50 MCG/ACT nasal spray, Spray 2 Sprays in nose every day., Disp: 1 Bottle, Rfl: 0  ALLERGIES:   Allergies   Allergen Reactions   • Aspirin Hives and Swelling   • Ibuprofen Swelling   • Naproxen Anaphylaxis     SURGHX:   Past Surgical History:   Procedure Laterality Date   • US-CYST ASPIRATION-BREAST INITIAL Left 5-   • HYSTEROSCOPY NOVASURE-2  8/1/2014    Performed by Emmanuel Mac M.D. at SURGERY SAME DAY [facility] ORS   • ANTROSTOMY  4/3/2013    Performed by Camryn Dia M.D. at SURGERY SAME DAY [facility] ORS   • ETHMOIDECTOMY  4/3/2013    Performed by Camryn Dia M.D. at SURGERY SAME DAY [facility] ORS   • SPHENOIDECTOMY  4/3/2013    Performed by Camryn Dia M.D. at SURGERY SAME DAY [facility] ORS   • TURBINOPLASTY  4/3/2013    Performed by Camryn Dia M.D. at SURGERY SAME DAY [facility] ORS     SOCHX:  reports that she has never smoked. She has never used smokeless tobacco. She reports that she does not drink alcohol and does not use drugs.  FH:   Family History   Problem Relation Age of Onset   • Non-contributory Mother    • Other Mother         healthy   • Non-contributory Father    • Other Father         unknown   • Cancer Maternal Grandfather         brain then breast cancer     Review of Systems   Constitutional: Negative for fever.   HENT: Positive for congestion and sinus pressure (sinus pain). Negative for sore throat.    Respiratory: Negative for cough and shortness of breath.    Gastrointestinal: Negative for vomiting.   Skin: Negative for rash.        Objective:   There were no vitals taken for this visit.  Physical Exam  Vitals and nursing note reviewed.   Constitutional:       Appearance: Normal appearance.   HENT:       Head: Normocephalic.      Right Ear: External ear normal.      Left Ear: External ear normal.   Eyes:      Conjunctiva/sclera: Conjunctivae normal.   Pulmonary:      Effort: No respiratory distress.   Skin:     Findings: No rash.   Neurological:      Mental Status: She is alert and oriented to person, place, and time.   Psychiatric:         Behavior: Behavior normal.         Thought Content: Thought content normal.         Judgment: Judgment normal.           Assessment/Plan:   1. Sinusitis, unspecified chronicity, unspecified location  - amoxicillin-clavulanate (AUGMENTIN) 875-125 MG Tab; Take 1 Tablet by mouth 2 times a day for 7 days.  Dispense: 14 Tablet; Refill: 0    2. Sinus pain  - amoxicillin-clavulanate (AUGMENTIN) 875-125 MG Tab; Take 1 Tablet by mouth 2 times a day for 7 days.  Dispense: 14 Tablet; Refill: 0        Medical Decision Making/Course:  In the course of preparing for this visit with review of the pertinent past medical history, recent and past clinic visits, current medications, and performing chart, immunization, medical history and medication reconciliation, and in the further course of obtaining the current history pertinent to the clinic visit today, performing an exam and evaluation, ordering and independently evaluating labs, tests, and/or procedures, prescribing any recommended new medications as noted above, providing any pertinent counseling and education and recommending further coordination of care, at least 20 minutes of total time were spent during this encounter.      Discussed close monitoring, return precautions, and supportive measures of maintaining adequate fluid hydration and caloric intake, relative rest and symptom management as needed for pain and/or fever.    Differential diagnosis, natural history, supportive care, and indications for immediate follow-up discussed.     Advised the patient to follow-up with the primary care physician for recheck, reevaluation, and  consideration of further management.    Please note that this dictation was created using voice recognition software. I have worked with consultants from the vendor as well as technical experts from [facility] to optimize the interface. I have made every reasonable attempt to correct obvious errors, but I expect that there are errors of grammar and possibly content that I did not discover before finalizing the note.

## 2021-11-12 NOTE — ADDENDUM NOTE
INFECTIOUS DISEASE PROGRESS NOTE    ASSESSMENT:   1.  Infected left upper extremity midline catheter with grossly purulent drainage from site following removal of left extremity midline. Concerning for exit site infection without bacteremia due to Methicillin resistant Staph epidermidis: s/p treatment  2.  Acute thrombosis of left axillary and basilic veins,no evidence of septic thrombophlebitis, blood cx remain negative   3.  Fever: improved             - multifactorial- PNA/tracheobronchitis (MSSA and klebsiella oxytoca), LUE midline catheter exit site infection, central (ICH)    4.  Unwitnessed fall from 25 feet at a construction site  5.  Status post open posterior fixation of C2 cervical spine on   6.  Status post tracheostomy and PEG placement 10/3  7.  Status post right sided chest tube placement  for pneumothorax, removed on 10/5     PLAN:  Remains afebrile  Trach decannulated, stable respiratory status   completed 7 days of Zosyn on 10/14 for aspiration pneumonia  Completed 10 days of vancomycin on 10/18 for infected midline  Disposition pending  Ok to DC from ID standpoint once medically cleared    Yuly Oglesby DO  [phone]          -------------------------------------------------------------------------------------------------------------------    SUBJECTIVE:    No overnight events  Afebrile  No new complaints    OBJECTIVE:  Tmax Temp (24hrs), Av.9 °F (36.6 °C), Min:97.7 °F (36.5 °C), Max:98.1 °F (36.7 °C)     Last Vitals   Vitals:    21 1543   BP: 123/72   Pulse: 81   Resp: 16   Temp: 97.7 °F (36.5 °C)       Gen: awake, alert, NAD  HEENT:anicteric, trach capped, site clean  CV: RRR, normal S1 S2  Pulm: clear breath sounds with diminished breath sounds at the bases  Abd: soft, non-tender, non-distended, positive bowel sounds, PEG site clean  Ext: no edema, moves all extremities, right-sided weakness  Psych: calm, cooperative  Skin: no rash, chin DTI,  Addended by: CHENCHO VANESSA on: 7/13/2017 08:20 AM     Modules accepted: Orders     scabbed    ANTIMICROBIALS  None     LAB:  Recent Labs     11/10/21  0726   WBC 8.1   HGB 11.0*   HCT 35.0*      MCV 95.1       Recent Labs   Lab 11/10/21  0726 11/08/21  0645 11/06/21  0704   SODIUM 138 136 137   POTASSIUM 4.1 3.9 4.1   CHLORIDE 105 103 103   CO2 27 28 29   BUN 14 18 17   CREATININE 0.48* 0.50* 0.55*   GLUCOSE 167* 179* 122*   CALCIUM 9.1 9.0 9.3       Microbiology Results     None            RADIOLOGY: images reviewed

## 2022-04-15 ENCOUNTER — TELEMEDICINE (OUTPATIENT)
Dept: TELEHEALTH | Facility: TELEMEDICINE | Age: 47
End: 2022-04-15
Payer: COMMERCIAL

## 2022-04-15 DIAGNOSIS — F43.29 STRESS AND ADJUSTMENT REACTION: ICD-10-CM

## 2022-04-15 DIAGNOSIS — R25.3 MUSCLE TWITCH: ICD-10-CM

## 2022-04-15 PROCEDURE — 99213 OFFICE O/P EST LOW 20 MIN: CPT | Mod: 95 | Performed by: NURSE PRACTITIONER

## 2022-04-15 NOTE — PROGRESS NOTES
Virtual Visit: Established Patient   This visit was conducted via Zoom using secure and encrypted videoconferencing technology.   The patient was in their home in the state [facility].    The patient's identity was confirmed and verbal consent was obtained for this virtual visit.     Subjective:   CC: right facial twitching    Aniyah Shelton is a 46 y.o. female presenting for evaluation and management of: Right face/lip twitching over the past week.  Patient states that it had an acute onset, and has continued more often than not over the past week.  Patient does admit to being under increased stress lately.  Additionally, she states that she typically exercises on a daily basis and has not been exercising.  Patient denies fever, chills, vision changes, speech changes, or other systemic/constitutional symptoms.  Denies any history of brain cancer or other types of cerebral disorders.    ROS   All other systems are negative except as documented above within HPI.      Current medicines (including changes today)  Current Outpatient Medications   Medication Sig Dispense Refill   • traZODone (DESYREL) 50 MG Tab Take 1 Tab by mouth every bedtime. 30 Tab 3   • raNITidine (ZANTAC) 150 MG Tab Take 1 Tab by mouth 2 times a day. TWICE DAILY 180 Tab 3   • sumatriptan (IMITREX) 100 MG tablet Take 1 Tab by mouth Once PRN for Migraine for up to 1 dose. 10 Tab 3   • fluticasone (FLONASE) 50 MCG/ACT nasal spray Spray 2 Sprays in nose every day. 1 Bottle 0     No current facility-administered medications for this visit.       Patient Active Problem List    Diagnosis Date Noted   • IGT (impaired glucose tolerance) 04/02/2019   • Periodic headache syndrome, not intractable 05/09/2016   • Metrorrhagia 08/01/2014   • Vitamin D deficiency disease 10/15/2013   • Food allergy 11/15/2011   • GERD (gastroesophageal reflux disease) 05/24/2010        Objective:   There were no vitals taken for this visit.    Physical Exam:  Constitutional:  Alert, no distress, well-groomed.  Skin: No rashes in visible areas.  Eye: Round. Conjunctiva clear, lids normal. No icterus.   ENMT: Lips pink without lesions, good dentition, moist mucous membranes.  Right lower lip twitching.  Phonation normal.  Patient able to produce full and equal smile, puff of cheeks, and left eyebrows.  Neck: No masses, no thyromegaly. Moves freely without pain.  Respiratory: Unlabored respiratory effort, no cough or audible wheeze  Psych: Alert and oriented x3, normal affect and mood.     Assessment and Plan:   The following treatment plan was discussed:     1. Muscle twitch    2. Stress and adjustment reaction    Discussed with patient that it may be due to increased stress and lack of exercise.  Patient's initial limited neuro exam due to telehealth was within normal limits.  Advised patient should her symptoms continue and/or worsen over the next few days, to be seen in person for possible CT scan of the head.  Strict ED precautions discussed with patient.  Patient understands and agrees with plan of care.

## 2023-10-17 ENCOUNTER — TELEMEDICINE (OUTPATIENT)
Dept: TELEHEALTH | Facility: TELEMEDICINE | Age: 48
End: 2023-10-17
Payer: COMMERCIAL

## 2023-10-17 DIAGNOSIS — R11.2 NAUSEA VOMITING AND DIARRHEA: ICD-10-CM

## 2023-10-17 DIAGNOSIS — R19.7 NAUSEA VOMITING AND DIARRHEA: ICD-10-CM

## 2023-10-17 PROCEDURE — 99213 OFFICE O/P EST LOW 20 MIN: CPT | Mod: 95 | Performed by: PHYSICIAN ASSISTANT

## 2023-10-17 RX ORDER — ONDANSETRON 4 MG/1
4 TABLET, ORALLY DISINTEGRATING ORAL EVERY 6 HOURS PRN
Qty: 10 TABLET | Refills: 0 | Status: SHIPPED | OUTPATIENT
Start: 2023-10-17 | End: 2024-02-20

## 2023-10-17 ASSESSMENT — ENCOUNTER SYMPTOMS
SWEATS: 0
ARTHRALGIAS: 0
BLOOD IN STOOL: 0
COUGH: 0
NAUSEA: 1
DIARRHEA: 1
VOMITING: 1
FLATUS: 0
FLANK PAIN: 0
RESPIRATORY NEGATIVE: 1
HEADACHES: 1
BLOATING: 1
CARDIOVASCULAR NEGATIVE: 1
MYALGIAS: 0
FEVER: 0
CONSTIPATION: 0
ABDOMINAL PAIN: 0
SORE THROAT: 0
CHILLS: 1

## 2023-10-17 NOTE — PROGRESS NOTES
Subjective     Aniyah Shelton is a very pleasant 48 y.o. female who presents with Diarrhea      This evaluation was conducted via Zoom using secure and encrypted videoconferencing technology. The patient was in a private location in the [facility].    The patient's identity was confirmed and verbal consent was obtained for this virtual visit.             Diarrhea   This is a new problem. The current episode started in the past 7 days. The problem occurs 5 to 10 times per day. The problem has been unchanged. The stool consistency is described as Watery. Associated symptoms include bloating, chills, headaches and vomiting. Pertinent negatives include no abdominal pain, arthralgias, coughing, fever, increased  flatus, myalgias, sweats or URI. Nothing aggravates the symptoms. Risk factors include suspect food intake. She has tried bismuth subsalicylate, increased fluids, analgesics and electrolyte solution for the symptoms. The treatment provided mild relief.       PMH:  has a past medical history of Anxiety, GERD (gastroesophageal reflux disease) (5/24/2010), Heart burn, Indigestion, Migraine (5/24/2010), Other specified disorder of intestines, Psychiatric problem, and Sinusitis.    She has no past medical history of Allergy or Anemia.  MEDS:   Current Outpatient Medications:     traZODone (DESYREL) 50 MG Tab, Take 1 Tab by mouth every bedtime., Disp: 30 Tab, Rfl: 3    raNITidine (ZANTAC) 150 MG Tab, Take 1 Tab by mouth 2 times a day. TWICE DAILY, Disp: 180 Tab, Rfl: 3    sumatriptan (IMITREX) 100 MG tablet, Take 1 Tab by mouth Once PRN for Migraine for up to 1 dose., Disp: 10 Tab, Rfl: 3    fluticasone (FLONASE) 50 MCG/ACT nasal spray, Spray 2 Sprays in nose every day., Disp: 1 Bottle, Rfl: 0  ALLERGIES:   Allergies   Allergen Reactions    Aspirin Hives and Swelling    Ibuprofen Swelling    Naproxen Anaphylaxis     SURGHX:   Past Surgical History:   Procedure Laterality Date    US-CYST ASPIRATION-BREAST  INITIAL Left 5-    HYSTEROSCOPY NOVASURE-2  8/1/2014    Performed by Emmanuel Mac M.D. at SURGERY SAME DAY [facility] ORS    ANTROSTOMY  4/3/2013    Performed by Camryn Dia M.D. at SURGERY SAME DAY [facility] ORS    ETHMOIDECTOMY  4/3/2013    Performed by Camryn Dia M.D. at SURGERY SAME DAY [facility] ORS    SPHENOIDECTOMY  4/3/2013    Performed by Camryn Dia M.D. at SURGERY SAME DAY [facility] ORS    TURBINOPLASTY  4/3/2013    Performed by Camryn Dia M.D. at SURGERY SAME DAY [facility] ORS     SOCHX:  reports that she has never smoked. She has never used smokeless tobacco. She reports that she does not drink alcohol and does not use drugs.  FH: family history includes Cancer in her maternal grandfather; Non-contributory in her father and mother; Other in her father and mother.      Review of Systems   Constitutional:  Positive for chills and malaise/fatigue. Negative for fever.   HENT:  Negative for congestion, ear pain and sore throat.    Respiratory: Negative.  Negative for cough.    Cardiovascular: Negative.    Gastrointestinal:  Positive for bloating, diarrhea, nausea and vomiting. Negative for abdominal pain, blood in stool, constipation, flatus and melena.   Genitourinary: Negative.  Negative for dysuria, flank pain, frequency, hematuria and urgency.   Musculoskeletal:  Negative for arthralgias and myalgias.   Neurological:  Positive for headaches.       Medications, Allergies, and current problem list reviewed today in Epic           Objective     There were no vitals taken for this visit.     Physical Exam  Nursing note reviewed.   Constitutional:       General: She is not in acute distress.     Appearance: Normal appearance. She is well-developed. She is not ill-appearing or toxic-appearing.   HENT:      Head: Normocephalic and atraumatic.      Right Ear: External ear normal.      Left Ear: External ear normal.      Nose: Nose normal.   Eyes:      Conjunctiva/sclera:  Conjunctivae normal.   Pulmonary:      Effort: Pulmonary effort is normal. No respiratory distress.      Breath sounds: No stridor. No wheezing.   Abdominal:      Comments: Had patient do a full palpation of all quadrants of abdomen.  There was no pain, rebound or guarding.  She states her abdomen has no distention or swelling.  There is no skin change or rash.   Neurological:      Mental Status: She is alert and oriented to person, place, and time.   Psychiatric:         Mood and Affect: Mood normal.         Behavior: Behavior normal.         Thought Content: Thought content normal.         Judgment: Judgment normal.                             Assessment & Plan     This is a very pleasant 48-year-old female presenting with nausea, vomiting and diarrhea for the past 3.5 days.  Emesis was only the first day.  Still having watery diarrhea and nausea.  She denies mucus or hematochezia/melena.  Initially with some abdominal pain but now just some generalized bloating.  No sharp stabbing or localizing pain.  She denies fever, chills or body aches.  She denies cough, congestion or URI symptoms.  She states she is starting to regain an appetite and is tolerating fluids.  No UTI symptoms and normal urine output noted.  No sick contacts, recent travel, new medications, recent antibiotic use, hospitalizations.  Patient does note possible suspect food intake from a restaurant 12 hours prior to onset.  She has no other pertinent past abdominal surgical history aside from GERD.  Patient is well-appearing nontoxic in no apparent distress.  Her subjective exam shows no abdominal TTP, distention, rebound or guarding.  Patient states her symptoms are improving therefore we will treat symptomatically and monitor at this time.  Possible viral versus foodborne enteritis.  No obvious signs or symptoms of acute abdomen but red flag symptoms reiterated at length with patient.  Symptomatic relief, fluids and OTC meds.  [place] diet as  discussed.  She will go to ER immediately for worsening or changing symptoms.  If no improvement she will be seen in clinic in person for full evaluation.    1. Nausea vomiting and diarrhea  ondansetron (ZOFRAN ODT) 4 MG TABLET DISPERSIBLE          I personally reviewed prior external notes and test results pertinent to today's visit. Return to clinic or go to ED if symptoms worsen or persist. Red flag symptoms and indications for ED discussed at length. Patient/Parent/Guardian voices understanding.  AVS with post-visit instructions provided or given verbally.  Follow-up with your primary care provider in 3-5 days. All side effects and potential interactions of prescribed medication discussed including allergic response, GI upset, tendon injury, rash, sedation, OCP effectiveness, etc.    Please note that this dictation was created using voice recognition software. I have made every reasonable attempt to correct obvious errors, but I expect that there are errors of grammar and possibly content that I did not discover before finalizing the note.

## 2023-11-17 ENCOUNTER — TELEMEDICINE (OUTPATIENT)
Dept: TELEHEALTH | Facility: TELEMEDICINE | Age: 48
End: 2023-11-17
Payer: COMMERCIAL

## 2023-11-17 DIAGNOSIS — J32.9 RHINOSINUSITIS: ICD-10-CM

## 2023-11-17 PROCEDURE — 99213 OFFICE O/P EST LOW 20 MIN: CPT | Mod: 95 | Performed by: FAMILY MEDICINE

## 2023-11-17 RX ORDER — AMOXICILLIN AND CLAVULANATE POTASSIUM 875; 125 MG/1; MG/1
1 TABLET, FILM COATED ORAL 2 TIMES DAILY
Qty: 14 TABLET | Refills: 0 | Status: SHIPPED | OUTPATIENT
Start: 2023-11-17 | End: 2023-11-24

## 2023-11-17 NOTE — PROGRESS NOTES
Virtual Visit: Established Patient   This visit was conducted via Zoom using secure and encrypted videoconferencing technology.   The patient was in a private location outside of their home in the state of [place].    The patient's identity was confirmed and verbal consent was obtained for this virtual visit.     Subjective:   CC: Sinus infection    Aniyah Shelton is a 48 y.o. female presenting for evaluation and management of:    1 month sinus pressure, burning, drainage. No relief with mucinex. PMH sinusitis as well as sinus pressure. No fever. Mild cough due to drainage.       ROS       Current medicines (including changes today)  Current Outpatient Medications   Medication Sig Dispense Refill    ondansetron (ZOFRAN ODT) 4 MG TABLET DISPERSIBLE Take 1 Tablet by mouth every 6 hours as needed for Nausea/Vomiting. 10 Tablet 0    traZODone (DESYREL) 50 MG Tab Take 1 Tab by mouth every bedtime. 30 Tab 3    raNITidine (ZANTAC) 150 MG Tab Take 1 Tab by mouth 2 times a day. TWICE DAILY 180 Tab 3    sumatriptan (IMITREX) 100 MG tablet Take 1 Tab by mouth Once PRN for Migraine for up to 1 dose. 10 Tab 3    fluticasone (FLONASE) 50 MCG/ACT nasal spray Spray 2 Sprays in nose every day. 1 Bottle 0     No current facility-administered medications for this visit.       Patient Active Problem List    Diagnosis Date Noted    IGT (impaired glucose tolerance) 04/02/2019    Periodic headache syndrome, not intractable 05/09/2016    Metrorrhagia 08/01/2014    Vitamin D deficiency disease 10/15/2013    Food allergy 11/15/2011    GERD (gastroesophageal reflux disease) 05/24/2010        Objective:   There were no vitals taken for this visit.    Physical Exam:  Constitutional: Alert, no distress, well-groomed.  Skin: No rashes in visible areas.  Eye: Round. Conjunctiva clear, lids normal. No icterus.   ENMT: Lips pink without lesions, good dentition, moist mucous membranes. Phonation normal.  Neck: No masses, no thyromegaly. Moves  freely without pain.  Respiratory: Unlabored respiratory effort, no cough or audible wheeze  Psych: Alert and oriented x3, normal affect and mood.     Assessment and Plan:   The following treatment plan was discussed:     1. Rhinosinusitis  amoxicillin-clavulanate (AUGMENTIN) 875-125 MG Tab          Differential diagnosis, natural history, supportive care, and indications for immediate follow-up were discussed.     Nasal saline, decongestant, nasal corticosteroid

## 2024-02-20 ENCOUNTER — OFFICE VISIT (OUTPATIENT)
Dept: URGENT CARE | Facility: CLINIC | Age: 49
End: 2024-02-20
Payer: COMMERCIAL

## 2024-02-20 VITALS
SYSTOLIC BLOOD PRESSURE: 120 MMHG | RESPIRATION RATE: 18 BRPM | TEMPERATURE: 99.6 F | BODY MASS INDEX: 26.13 KG/M2 | DIASTOLIC BLOOD PRESSURE: 74 MMHG | HEART RATE: 110 BPM | OXYGEN SATURATION: 98 % | WEIGHT: 162.6 LBS | HEIGHT: 66 IN

## 2024-02-20 DIAGNOSIS — R09.81 NASAL CONGESTION: ICD-10-CM

## 2024-02-20 DIAGNOSIS — R00.0 TACHYCARDIA: ICD-10-CM

## 2024-02-20 DIAGNOSIS — R50.9 FEVER, UNSPECIFIED FEVER CAUSE: ICD-10-CM

## 2024-02-20 DIAGNOSIS — J01.10 ACUTE NON-RECURRENT FRONTAL SINUSITIS: ICD-10-CM

## 2024-02-20 LAB
FLUAV RNA SPEC QL NAA+PROBE: NEGATIVE
FLUBV RNA SPEC QL NAA+PROBE: NEGATIVE
RSV RNA SPEC QL NAA+PROBE: NEGATIVE
SARS-COV-2 RNA RESP QL NAA+PROBE: NEGATIVE

## 2024-02-20 PROCEDURE — 3078F DIAST BP <80 MM HG: CPT

## 2024-02-20 PROCEDURE — 99214 OFFICE O/P EST MOD 30 MIN: CPT

## 2024-02-20 PROCEDURE — 3074F SYST BP LT 130 MM HG: CPT

## 2024-02-20 PROCEDURE — 0241U POCT CEPHEID COV-2, FLU A/B, RSV - PCR: CPT

## 2024-02-20 RX ORDER — BENZONATATE 100 MG/1
100 CAPSULE ORAL 3 TIMES DAILY PRN
Qty: 30 CAPSULE | Refills: 0 | Status: SHIPPED | OUTPATIENT
Start: 2024-02-20

## 2024-02-20 RX ORDER — AMOXICILLIN AND CLAVULANATE POTASSIUM 875; 125 MG/1; MG/1
1 TABLET, FILM COATED ORAL 2 TIMES DAILY
Qty: 10 TABLET | Refills: 0 | Status: SHIPPED | OUTPATIENT
Start: 2024-02-20 | End: 2024-02-25

## 2024-02-20 RX ORDER — FLUTICASONE PROPIONATE 50 MCG
1 SPRAY, SUSPENSION (ML) NASAL DAILY
Qty: 16 G | Refills: 0 | Status: SHIPPED | OUTPATIENT
Start: 2024-02-20

## 2024-02-20 ASSESSMENT — ENCOUNTER SYMPTOMS
WEAKNESS: 0
COUGH: 0
SENSORY CHANGE: 0
SORE THROAT: 1
SINUS PAIN: 1
VOMITING: 0
MYALGIAS: 0
FEVER: 0
SHORTNESS OF BREATH: 0
NAUSEA: 0
HEADACHES: 1
DIZZINESS: 0
ABDOMINAL PAIN: 0
CHILLS: 0

## 2024-02-20 NOTE — PROGRESS NOTES
Chief Complaint   Patient presents with   • Nasal Congestion     X Thursday started with throat pain, now having nasal congestion, facial pressure, pressure headache       HISTORY OF PRESENT ILLNESS: Patient is a pleasant 48 y.o. female who presents to urgent care today cold-like symptoms with nasal congestion, sinus pain and pressure and a sore throat that started on Thursday.  Patient took an at-home COVID test which was negative.  She has been taking OTC medication with little to no relief.    Patient Active Problem List    Diagnosis Date Noted   • IGT (impaired glucose tolerance) 04/02/2019   • Periodic headache syndrome, not intractable 05/09/2016   • Metrorrhagia 08/01/2014   • Vitamin D deficiency disease 10/15/2013   • Food allergy 11/15/2011   • GERD (gastroesophageal reflux disease) 05/24/2010       Allergies:Aspirin, Ibuprofen, and Naproxen    Current Outpatient Medications Ordered in Epic   Medication Sig Dispense Refill   • fluticasone (FLONASE) 50 MCG/ACT nasal spray Administer 1 Spray into affected nostril(S) every day. 16 g 0   • benzonatate (TESSALON) 100 MG Cap Take 1 Capsule by mouth 3 times a day as needed for Cough. 30 Capsule 0   • amoxicillin-clavulanate (AUGMENTIN) 875-125 MG Tab Take 1 Tablet by mouth 2 times a day for 5 days. 10 Tablet 0     No current Epic-ordered facility-administered medications on file.       Past Medical History:   Diagnosis Date   • Anxiety     not on meds   • GERD (gastroesophageal reflux disease) 5/24/2010   • Heart burn     on prevacid   • Indigestion    • Migraine 5/24/2010   • Other specified disorder of intestines     irregular   • Psychiatric problem     anxiety   • Sinusitis        Social History     Tobacco Use   • Smoking status: Never   • Smokeless tobacco: Never   Substance Use Topics   • Alcohol use: No   • Drug use: No       Family Status   Relation Name Status   • Mo  Alive   • Fa  Alive   • Son 1 Alive   • Ericka 1 Alive   • MGFa  (Not Specified)  "    Family History   Problem Relation Age of Onset   • Non-contributory Mother    • Other Mother         healthy   • Non-contributory Father    • Other Father         unknown   • Cancer Maternal Grandfather         brain then breast cancer       Review of Systems   Constitutional:  Positive for malaise/fatigue. Negative for chills and fever.   HENT:  Positive for congestion, sinus pain and sore throat.    Respiratory:  Negative for cough and shortness of breath.    Gastrointestinal:  Negative for abdominal pain, nausea and vomiting.   Musculoskeletal:  Negative for myalgias.   Neurological:  Positive for headaches. Negative for dizziness, sensory change and weakness.       Exam:  /74 (BP Location: Left arm, Patient Position: Sitting, BP Cuff Size: Adult)   Pulse (!) 110   Temp 37.6 °C (99.6 °F) (Temporal)   Resp 18   Ht 1.676 m (5' 6\")   Wt 73.8 kg (162 lb 9.6 oz)   SpO2 98%   Physical Exam  Vitals reviewed.   Constitutional:       Appearance: Normal appearance. She is normal weight. She is not toxic-appearing.   HENT:      Head: Normocephalic.      Right Ear: Tympanic membrane, ear canal and external ear normal. There is no impacted cerumen.      Left Ear: Tympanic membrane, ear canal and external ear normal. There is no impacted cerumen.      Nose: Congestion and rhinorrhea present. No nasal tenderness or mucosal edema. Rhinorrhea is clear.      Right Sinus: Maxillary sinus tenderness and frontal sinus tenderness present.      Left Sinus: Maxillary sinus tenderness and frontal sinus tenderness present.      Mouth/Throat:      Mouth: Mucous membranes are moist.      Pharynx: Posterior oropharyngeal erythema present. No oropharyngeal exudate.      Tonsils: No tonsillar exudate. 1+ on the right. 1+ on the left.   Eyes:      General:         Right eye: No discharge.         Left eye: No discharge.      Extraocular Movements: Extraocular movements intact.      Conjunctiva/sclera: Conjunctivae normal.      " Pupils: Pupils are equal, round, and reactive to light.   Cardiovascular:      Rate and Rhythm: Regular rhythm. Tachycardia present.      Pulses: Normal pulses.      Heart sounds: Normal heart sounds. No murmur heard.  Pulmonary:      Effort: Pulmonary effort is normal. No respiratory distress.      Breath sounds: Normal breath sounds. No stridor. No wheezing, rhonchi or rales.   Musculoskeletal:         General: No swelling, tenderness, deformity or signs of injury.      Cervical back: Normal range of motion and neck supple. No tenderness.      Right lower leg: No edema.      Left lower leg: No edema.   Skin:     General: Skin is warm and dry.      Capillary Refill: Capillary refill takes less than 2 seconds.      Findings: No bruising, erythema, lesion or rash.   Neurological:      General: No focal deficit present.      Mental Status: She is alert.      Sensory: No sensory deficit.      Motor: No weakness.      Coordination: Coordination normal.      Gait: Gait normal.   Psychiatric:         Mood and Affect: Mood normal.         Behavior: Behavior normal.         Thought Content: Thought content normal.         Judgment: Judgment normal.       Assessment/Plan:  1. Acute non-recurrent frontal sinusitis  - amoxicillin-clavulanate (AUGMENTIN) 875-125 MG Tab; Take 1 Tablet by mouth 2 times a day for 5 days.  Dispense: 10 Tablet; Refill: 0    2. Nasal congestion  - POCT CoV-2, Flu A/B, RSV by PCR  - fluticasone (FLONASE) 50 MCG/ACT nasal spray; Administer 1 Spray into affected nostril(S) every day.  Dispense: 16 g; Refill: 0  - benzonatate (TESSALON) 100 MG Cap; Take 1 Capsule by mouth 3 times a day as needed for Cough.  Dispense: 30 Capsule; Refill: 0    3. Fever, unspecified fever cause  - POCT CoV-2, Flu A/B, RSV by PCR    4. Tachycardia  - POCT CoV-2, Flu A/B, RSV by PCR  Based on physical exam along with review of systems I did go ahead and check patient for flu and COVID today, they were negative at this time.   Patient is tachycardic showing acute signs of systemic illness.  As patient has ongoing nasal congestion with headache, green and yellow sputum production I did go ahead and prescribe Augmentin as well as Flonase and Tessalon and an effort to dry her up.  Reviewed plan of care with the patient, she is aware and agreeable this time, advise she follow-up if she continues to get worse or does not improve.  Patient advised take medications with food, drink plenty of fluids.      Supportive care, differential diagnoses, and indications for immediate follow-up discussed with patient.   Pathogenesis of diagnosis discussed including typical length and natural progression.   Instructed to return to clinic or nearest emergency department for any change in condition, further concerns, or worsening of symptoms.  Patient states understanding of the plan of care and discharge instructions.  Instructed to make an appointment, for follow up, with primary care provider.    Please note that this dictation was created using voice recognition software. I have made every reasonable attempt to correct obvious errors, but I expect that there are errors of grammar and possibly content that I did not discover before finalizing the note.      Lindy VILLEDA

## 2024-04-17 DIAGNOSIS — R09.81 NASAL CONGESTION: ICD-10-CM

## 2024-07-16 RX ORDER — FLUTICASONE PROPIONATE 50 MCG
1 SPRAY, SUSPENSION (ML) NASAL DAILY
Qty: 16 G | Refills: 0 | OUTPATIENT
Start: 2024-07-16

## 2024-09-30 ENCOUNTER — APPOINTMENT (OUTPATIENT)
Dept: URGENT CARE | Facility: CLINIC | Age: 49
End: 2024-09-30
Payer: COMMERCIAL

## 2024-09-30 ENCOUNTER — TELEMEDICINE (OUTPATIENT)
Dept: TELEHEALTH | Facility: TELEMEDICINE | Age: 49
End: 2024-09-30
Payer: COMMERCIAL

## 2024-09-30 ENCOUNTER — APPOINTMENT (OUTPATIENT)
Dept: TELEHEALTH | Facility: TELEMEDICINE | Age: 49
End: 2024-09-30
Payer: COMMERCIAL

## 2024-09-30 DIAGNOSIS — H01.001 BLEPHARITIS OF RIGHT UPPER EYELID, UNSPECIFIED TYPE: ICD-10-CM

## 2024-09-30 RX ORDER — POLYMYXIN B SULFATE AND TRIMETHOPRIM 1; 10000 MG/ML; [USP'U]/ML
1 SOLUTION OPHTHALMIC EVERY 4 HOURS
Qty: 10 ML | Refills: 0 | Status: SHIPPED | OUTPATIENT
Start: 2024-09-30 | End: 2024-09-30 | Stop reason: SDUPTHER

## 2024-09-30 RX ORDER — POLYMYXIN B SULFATE AND TRIMETHOPRIM 1; 10000 MG/ML; [USP'U]/ML
1 SOLUTION OPHTHALMIC EVERY 4 HOURS
Qty: 10 ML | Refills: 0 | Status: SHIPPED | OUTPATIENT
Start: 2024-09-30 | End: 2024-10-07

## 2024-09-30 ASSESSMENT — ENCOUNTER SYMPTOMS
BLURRED VISION: 0
EYE REDNESS: 1
CONSTITUTIONAL NEGATIVE: 1
EYE DISCHARGE: 0
DOUBLE VISION: 0
FEVER: 0
EYE ITCHING: 0
SORE THROAT: 1
PHOTOPHOBIA: 0

## 2024-09-30 NOTE — PROGRESS NOTES
Subjective:     Aniyah Shelton is a 49 y.o. female who presents for No chief complaint on file.       NOTE: This encounter was conducted via and with secure and encrypted videoconferencing equipment. The patient was in a private location in the [facility]. The patient's identity was confirmed and verbal consent was obtained to proceed with this virtual visit.    Eye Problem   The right eye is affected. This is a new problem. Episode onset: Thursday. The problem has been gradually worsening. There was no injury mechanism. She Does not wear contacts. Associated symptoms include eye redness. Pertinent negatives include no blurred vision, eye discharge, double vision, fever, itching or photophobia.     CC of new onset right upper eyelid redness, swelling, and 5/10 pain. Denies injury.    Eyeball appears fine, white, no discharge, itching, localized tenderness, or fever. Symptoms localized to right upper eyelid. Does not spread to eyebrow or rest of face.    Was using a mascara which she stopped using when symptoms started.    Review of Systems   Constitutional: Negative.  Negative for fever.   HENT:  Positive for sore throat (Itchy throat).    Eyes:  Positive for redness. Negative for blurred vision, double vision, photophobia, discharge and itching.        Right upper eyelid redness, swelling, pain   All other systems reviewed and are negative.    Additional details per HPI.     PMH:  has a past medical history of Anxiety, GERD (gastroesophageal reflux disease) (5/24/2010), Heart burn, Indigestion, Migraine (5/24/2010), Other specified disorder of intestines, Psychiatric problem, and Sinusitis.    She has no past medical history of Allergy or Anemia.    MEDS:   Current Outpatient Medications:     polymixin-trimethoprim (POLYTRIM) 54636-8.1 UNIT/ML-% Solution, Administer 1 Drop into the right eye every 4 hours for 7 days., Disp: 10 mL, Rfl: 0    fluticasone (FLONASE) 50 MCG/ACT nasal spray, Administer 1 Spray  into affected nostril(S) every day., Disp: 16 g, Rfl: 0    benzonatate (TESSALON) 100 MG Cap, Take 1 Capsule by mouth 3 times a day as needed for Cough., Disp: 30 Capsule, Rfl: 0    ALLERGIES:   Allergies   Allergen Reactions    Aspirin Hives and Swelling    Ibuprofen Swelling    Naproxen Anaphylaxis     SURGHX:   Past Surgical History:   Procedure Laterality Date    US-CYST ASPIRATION-BREAST INITIAL Left 5-    HYSTEROSCOPY NOVASURE-2  8/1/2014    Performed by Emmanuel Mac M.D. at SURGERY SAME DAY [facility]S    ANTROSTOMY  4/3/2013    Performed by Camryn Dia M.D. at SURGERY SAME DAY [facility]S    ETHMOIDECTOMY  4/3/2013    Performed by Camryn Dia M.D. at SURGERY SAME DAY [facility]S    SPHENOIDECTOMY  4/3/2013    Performed by Camryn Dia M.D. at SURGERY SAME DAY [facility]S    TURBINOPLASTY  4/3/2013    Performed by Camryn Dia M.D. at SURGERY SAME DAY [facility]S     SOCHX:  reports that she has never smoked. She has never used smokeless tobacco. She reports that she does not drink alcohol and does not use drugs.     FH: Reviewed with patient, not pertinent to this visit.      Objective:     NOTE: Virtual encounter. Physical exam limited. Vital signs not performed.    Physical Exam  Constitutional:       Comments: Sounds non-distressed   HENT:      Mouth/Throat:      Comments: Phonation normal  Pulmonary:      Comments: No cough or audible wheeze, speaks in full sentences  Neurological:      Mental Status: She is alert and oriented to person, place, and time.   Psychiatric:         Mood and Affect: Mood normal.         Speech: Speech normal.       Assessment/Plan:     1. Blepharitis of right upper eyelid, unspecified type  - polymixin-trimethoprim (POLYTRIM) 58387-0.1 UNIT/ML-% Solution; Administer 1 Drop into the right eye every 4 hours for 7 days.  Dispense: 10 mL; Refill: 0    Prefers drops. Rx as above sent electronically. Advised to instill in eye as well as  apply to outside of eyelid. Recommend warm compress 20 minutes at a time and ibuprofen PRN.    Monitor. Warning signs reviewed. Return precautions advised.     Differential diagnosis, natural history, supportive care, over-the-counter symptom management per 's instructions, close monitoring, and indications for immediate follow-up discussed.     All questions answered. Patient agrees with the plan of care.    Discharge summary provided via [employer].

## 2024-10-03 ENCOUNTER — OFFICE VISIT (OUTPATIENT)
Dept: URGENT CARE | Facility: CLINIC | Age: 49
End: 2024-10-03
Payer: COMMERCIAL

## 2024-10-03 ENCOUNTER — APPOINTMENT (OUTPATIENT)
Dept: TELEHEALTH | Facility: TELEMEDICINE | Age: 49
End: 2024-10-03
Payer: COMMERCIAL

## 2024-10-03 VITALS
WEIGHT: 163 LBS | SYSTOLIC BLOOD PRESSURE: 126 MMHG | BODY MASS INDEX: 26.2 KG/M2 | RESPIRATION RATE: 12 BRPM | DIASTOLIC BLOOD PRESSURE: 78 MMHG | OXYGEN SATURATION: 98 % | HEART RATE: 85 BPM | TEMPERATURE: 98.5 F | HEIGHT: 66 IN

## 2024-10-03 DIAGNOSIS — H57.89 EYE SWELLING, RIGHT: ICD-10-CM

## 2024-10-03 DIAGNOSIS — H00.011 HORDEOLUM EXTERNUM OF RIGHT UPPER EYELID: ICD-10-CM

## 2024-10-03 PROCEDURE — 3078F DIAST BP <80 MM HG: CPT

## 2024-10-03 PROCEDURE — 3074F SYST BP LT 130 MM HG: CPT

## 2024-10-03 PROCEDURE — 99213 OFFICE O/P EST LOW 20 MIN: CPT

## 2024-10-03 RX ORDER — ERYTHROMYCIN 5 MG/G
1 OINTMENT OPHTHALMIC 4 TIMES DAILY
Qty: 3.5 G | Refills: 0 | Status: SHIPPED | OUTPATIENT
Start: 2024-10-03 | End: 2024-10-13

## 2024-10-03 RX ORDER — DEXAMETHASONE SODIUM PHOSPHATE 10 MG/ML
8 INJECTION INTRAMUSCULAR; INTRAVENOUS ONCE
Status: COMPLETED | OUTPATIENT
Start: 2024-10-03 | End: 2024-10-03

## 2024-10-03 RX ADMIN — DEXAMETHASONE SODIUM PHOSPHATE 8 MG: 10 INJECTION INTRAMUSCULAR; INTRAVENOUS at 11:59

## 2024-10-03 ASSESSMENT — ENCOUNTER SYMPTOMS
EYE DISCHARGE: 0
SORE THROAT: 1
HEADACHES: 0
ABDOMINAL PAIN: 0
DOUBLE VISION: 0
EYE PAIN: 0
FEVER: 0
SPUTUM PRODUCTION: 0
STRIDOR: 0
WEAKNESS: 0
SINUS PAIN: 0
PALPITATIONS: 0
DIARRHEA: 0
WHEEZING: 0
NECK PAIN: 0
VOMITING: 0
SHORTNESS OF BREATH: 0
PHOTOPHOBIA: 0
EYE REDNESS: 1
MYALGIAS: 0
COUGH: 1
DIZZINESS: 0
NAUSEA: 0
BLURRED VISION: 0

## 2024-10-03 ASSESSMENT — VISUAL ACUITY: OU: 1

## 2024-10-16 ENCOUNTER — APPOINTMENT (OUTPATIENT)
Dept: TELEHEALTH | Facility: TELEMEDICINE | Age: 49
End: 2024-10-16
Payer: COMMERCIAL

## 2025-03-19 ENCOUNTER — APPOINTMENT (OUTPATIENT)
Dept: TELEHEALTH | Facility: TELEMEDICINE | Age: 50
End: 2025-03-19
Payer: COMMERCIAL

## 2025-03-19 ENCOUNTER — APPOINTMENT (OUTPATIENT)
Dept: URGENT CARE | Facility: CLINIC | Age: 50
End: 2025-03-19
Payer: COMMERCIAL

## 2025-04-11 ENCOUNTER — OFFICE VISIT (OUTPATIENT)
Dept: URGENT CARE | Facility: CLINIC | Age: 50
End: 2025-04-11
Payer: COMMERCIAL

## 2025-04-11 ENCOUNTER — HOSPITAL ENCOUNTER (OUTPATIENT)
Facility: MEDICAL CENTER | Age: 50
End: 2025-04-11
Payer: COMMERCIAL

## 2025-04-11 VITALS
SYSTOLIC BLOOD PRESSURE: 130 MMHG | BODY MASS INDEX: 25.43 KG/M2 | RESPIRATION RATE: 14 BRPM | HEIGHT: 66 IN | HEART RATE: 84 BPM | DIASTOLIC BLOOD PRESSURE: 84 MMHG | OXYGEN SATURATION: 97 % | WEIGHT: 158.2 LBS | TEMPERATURE: 97.9 F

## 2025-04-11 DIAGNOSIS — N89.8 VAGINAL IRRITATION: ICD-10-CM

## 2025-04-11 LAB
APPEARANCE UR: CLEAR
BILIRUB UR STRIP-MCNC: NEGATIVE MG/DL
COLOR UR AUTO: YELLOW
GLUCOSE UR STRIP.AUTO-MCNC: NEGATIVE MG/DL
KETONES UR STRIP.AUTO-MCNC: NEGATIVE MG/DL
LEUKOCYTE ESTERASE UR QL STRIP.AUTO: NEGATIVE
NITRITE UR QL STRIP.AUTO: NEGATIVE
PH UR STRIP.AUTO: 5.5 [PH] (ref 5–8)
PROT UR QL STRIP: NEGATIVE MG/DL
RBC UR QL AUTO: NEGATIVE
SP GR UR STRIP.AUTO: 1.01
UROBILINOGEN UR STRIP-MCNC: 0.2 MG/DL

## 2025-04-11 PROCEDURE — 87660 TRICHOMONAS VAGIN DIR PROBE: CPT

## 2025-04-11 PROCEDURE — 87591 N.GONORRHOEAE DNA AMP PROB: CPT

## 2025-04-11 PROCEDURE — 87491 CHLMYD TRACH DNA AMP PROBE: CPT

## 2025-04-11 PROCEDURE — 3075F SYST BP GE 130 - 139MM HG: CPT

## 2025-04-11 PROCEDURE — 87510 GARDNER VAG DNA DIR PROBE: CPT

## 2025-04-11 PROCEDURE — 81002 URINALYSIS NONAUTO W/O SCOPE: CPT

## 2025-04-11 PROCEDURE — 87480 CANDIDA DNA DIR PROBE: CPT

## 2025-04-11 PROCEDURE — 3079F DIAST BP 80-89 MM HG: CPT

## 2025-04-11 PROCEDURE — 99213 OFFICE O/P EST LOW 20 MIN: CPT

## 2025-04-11 ASSESSMENT — ENCOUNTER SYMPTOMS
MYALGIAS: 0
FEVER: 0
VOMITING: 0
HEARTBURN: 0
FLANK PAIN: 0
NECK PAIN: 0
DIZZINESS: 0
CHILLS: 0
BACK PAIN: 0
ABDOMINAL PAIN: 0
NAUSEA: 0

## 2025-04-11 NOTE — PROGRESS NOTES
Subjective:   Aniyah Shelton is a 49 y.o. female who presents for UTI (Pt states she has irritation, was seen by her provider and was given fluconazole states this was last week )      Patient presents with complaints of vaginal irritation for the last 2 weeks.  Patient states she first started noticing symptoms and thought she had a yeast infection.  She was using over-the-counter antifungal cream which was not effective so she was seen by her gynecologist who did a vaginal exam and prescribed Diflucan oral.  Patient states that her provider told her that the exam might not be accurate as she had antifungal cream exam performed.  States that she finished Diflucan, is still having irritation.  She denies any vaginal discharge, vaginal itching, and no dysuria abdominal pain fever chills or bodyaches.  She does state that she is sexually active  with 1 partner.  Of note patient does state that in the last month or so she has started using PanOxyl acne medication as a vaginal cleanser.  She states that the first seem to be helping with vaginal odor, though symptoms she is experiencing right now came shortly after.    UTI  Pertinent negatives include no abdominal pain, chills, fever, myalgias, nausea, neck pain or vomiting.       Review of Systems   Constitutional:  Negative for chills and fever.   Gastrointestinal:  Negative for abdominal pain, heartburn, nausea and vomiting.   Genitourinary:  Positive for dysuria, frequency and urgency. Negative for flank pain and hematuria.   Musculoskeletal:  Negative for back pain, myalgias and neck pain.   Neurological:  Negative for dizziness.   All other systems reviewed and are negative.    Refer to HPI for additional details.    During this visit, appropriate PPE was worn, and hand hygiene was performed.    PMH:  has a past medical history of Anxiety, GERD (gastroesophageal reflux disease) (5/24/2010), Heart burn, Indigestion, Migraine (5/24/2010), Other specified disorder  "of intestines, Psychiatric problem, and Sinusitis.    She has no past medical history of Allergy or Anemia.    MEDS:   Current Outpatient Medications:     fluticasone (FLONASE) 50 MCG/ACT nasal spray, Administer 1 Spray into affected nostril(S) every day. (Patient not taking: Reported on 10/3/2024), Disp: 16 g, Rfl: 0    benzonatate (TESSALON) 100 MG Cap, Take 1 Capsule by mouth 3 times a day as needed for Cough. (Patient not taking: Reported on 10/3/2024), Disp: 30 Capsule, Rfl: 0    ALLERGIES:   Allergies   Allergen Reactions    Aspirin Hives and Swelling    Ibuprofen Swelling    Naproxen Anaphylaxis     SURGHX:   Past Surgical History:   Procedure Laterality Date    US-CYST ASPIRATION-BREAST INITIAL Left 5-    HYSTEROSCOPY WITH ENDOMETRIAL RADIOFREQUENCY ABLATION  8/1/2014    Performed by Emmanuel Mac M.D. at SURGERY SAME DAY [facility]    ANTROSTOMY  4/3/2013    Performed by Camryn Dia M.D. at SURGERY SAME DAY [facility]    ETHMOIDECTOMY  4/3/2013    Performed by Camryn Dia M.D. at [facility]ERY [facility] DAY [facility]    SPHENOIDECTOMY  4/3/2013    Performed by Camryn Dia M.D. at [facility]    TURBINOPLASTY  4/3/2013    Performed by Camryn Dia M.D. at [facility]     SOCHX:  reports that she has never smoked. She has never used smokeless tobacco. She reports that she does not drink alcohol and does not use drugs.    FH: Per HPI as applicable/pertinent.    Medications, Allergies, and current problem list reviewed today in Epic.     Objective:     /84 (BP Location: Left arm, Patient Position: Sitting, BP Cuff Size: Adult)   Pulse 84   Temp 36.6 °C (97.9 °F) (Temporal)   Resp 14   Ht 1.676 m (5' 6\")   Wt 71.8 kg (158 lb 3.2 oz)   SpO2 97%     Physical Exam  Vitals reviewed.   Constitutional:       General: She is not in acute distress.     Appearance: She is not ill-appearing.   HENT:      Mouth/Throat:      Mouth: Mucous " membranes are moist.      Pharynx: Oropharynx is clear.   Cardiovascular:      Rate and Rhythm: Normal rate.      Pulses: Normal pulses.   Pulmonary:      Effort: Pulmonary effort is normal.   Abdominal:      General: Abdomen is flat.      Tenderness: There is no abdominal tenderness. There is no right CVA tenderness, left CVA tenderness, guarding or rebound.   Genitourinary:     Comments: Deferred and not indicated  Skin:     General: Skin is warm and dry.   Neurological:      Mental Status: She is alert.         Assessment/Plan:     Diagnosis and associated orders:     1. Vaginal irritation  - POCT Urinalysis  - VAGINAL PATHOGENS DNA PANEL; Future  - Chlamydia/GC, PCR (Genital/Anal swab); Future     Comments/MDM:     Patient history and physical exam consistent with acute vaginal irritation.  No red flags no acute distress noted  I discussed HPI and physical exam with patient.  At this time I discussed potential treatment options with patient.  I did suggest doing an in clinic UA as well as repeat vaginal pathogen swab and also gonorrhea chlamydia swab for fullness and completeness  In clinic UA unremarkable.  Will still send out for culture to look for occult infection  Will follow-up with results of vaginal swabs  Outpatient management will consist of discontinue using PanOxyl, recommend using just water, if desired to use cleanser encouraged to use pH balanced cleanser.  Advised to use sitz bath, adequate hydration, loosefitting clothing, monitor symptoms  Follow up in 3-5 days if no improvement in symptoms           Differential diagnosis, natural history, supportive care, and indications for immediate follow-up discussed.    Advised the patient to follow-up with the primary care physician for recheck, reevaluation, and consideration of further management.    Please note that this dictation was created using voice recognition software. I have made a reasonable attempt to correct obvious errors, but I expect  that there are errors of grammar and possibly content that I did not discover before finalizing the note.    This note was electronically signed by NIKOLAY Brown

## 2025-04-12 DIAGNOSIS — N89.8 VAGINAL IRRITATION: ICD-10-CM

## 2025-04-12 LAB
CANDIDA DNA VAG QL PROBE+SIG AMP: NEGATIVE
G VAGINALIS DNA VAG QL PROBE+SIG AMP: POSITIVE
T VAGINALIS DNA VAG QL PROBE+SIG AMP: NEGATIVE

## 2025-04-13 ENCOUNTER — RESULTS FOLLOW-UP (OUTPATIENT)
Dept: URGENT CARE | Facility: PHYSICIAN GROUP | Age: 50
End: 2025-04-13

## 2025-04-13 DIAGNOSIS — B96.89 GARDNERELLA VAGINALIS INFECTION: ICD-10-CM

## 2025-04-13 DIAGNOSIS — N76.0 GARDNERELLA VAGINALIS INFECTION: ICD-10-CM

## 2025-04-13 LAB
C TRACH DNA GENITAL QL NAA+PROBE: NEGATIVE
N GONORRHOEA DNA GENITAL QL NAA+PROBE: NEGATIVE
SPECIMEN SOURCE: NORMAL

## 2025-04-13 RX ORDER — METRONIDAZOLE 500 MG/1
500 TABLET ORAL 2 TIMES DAILY
Qty: 14 TABLET | Refills: 0 | Status: SHIPPED | OUTPATIENT
Start: 2025-04-13 | End: 2025-04-20